# Patient Record
Sex: FEMALE | Race: WHITE | NOT HISPANIC OR LATINO | Employment: FULL TIME | URBAN - METROPOLITAN AREA
[De-identification: names, ages, dates, MRNs, and addresses within clinical notes are randomized per-mention and may not be internally consistent; named-entity substitution may affect disease eponyms.]

---

## 2022-09-06 ENCOUNTER — INITIAL PRENATAL (OUTPATIENT)
Dept: OBSTETRICS AND GYNECOLOGY | Facility: CLINIC | Age: 22
End: 2022-09-06

## 2022-09-06 VITALS — DIASTOLIC BLOOD PRESSURE: 60 MMHG | WEIGHT: 109.6 LBS | SYSTOLIC BLOOD PRESSURE: 110 MMHG

## 2022-09-06 DIAGNOSIS — Z86.39 HISTORY OF THYROID DISEASE: ICD-10-CM

## 2022-09-06 DIAGNOSIS — O21.9 NAUSEA AND VOMITING IN PREGNANCY: ICD-10-CM

## 2022-09-06 DIAGNOSIS — Z3A.08 8 WEEKS GESTATION OF PREGNANCY: Primary | ICD-10-CM

## 2022-09-06 DIAGNOSIS — Z86.32 HISTORY OF GESTATIONAL DIABETES MELLITUS: ICD-10-CM

## 2022-09-06 DIAGNOSIS — Z86.32 HISTORY OF GESTATIONAL DIABETES: ICD-10-CM

## 2022-09-06 PROBLEM — Z34.90 PREGNANCY: Status: ACTIVE | Noted: 2022-09-06

## 2022-09-06 PROCEDURE — 99204 OFFICE O/P NEW MOD 45 MIN: CPT | Performed by: OBSTETRICS & GYNECOLOGY

## 2022-09-06 RX ORDER — PROMETHAZINE HYDROCHLORIDE 12.5 MG/1
12.5 TABLET ORAL EVERY 6 HOURS PRN
Qty: 20 TABLET | Refills: 0 | Status: SHIPPED | OUTPATIENT
Start: 2022-09-06 | End: 2022-10-10

## 2022-09-06 NOTE — PROGRESS NOTES
"    Initial ob visit     CC- Here for care of pregnancy        Shira German is a 22 y.o. female, , who presents for her first obstetrical visit.  Her last LMP was Patient's last menstrual period was 2022..    Initial positive test date : 2022 UPT          Prior obstetric issues: GDM      G1   37w5d SROM;  GDM  G2   39w5d SROM when she arrived at the hospital for IOL;  GDM    Family history of genetic issues (includes FOB): denies  Prior infections concerning in pregnancy (Rash, fever in last 2 weeks): No  Varicella Hx - unknown   Prior testing for Cystic Fibrosis Carrier or Sickle Cell Trait- no  Prepregnancy BMI - There is no height or weight on file to calculate BMI.  History of STD: no  Hx of HSV for patient or partner: no  Ultrasound Today: yes; confirmed viable iup       Additional Pertinent History   Last Pap : 6mo. ago Result: negative HPV: unknown      Last Completed Pap Smear     This patient has no relevant Health Maintenance data.        History of abnormal Pap smear: no  Family history of uterine, colon, breast, or ovarian cancer: no  Feelings of Anxiety or Depression: no  Tobacco Usage?: No   Alcohol/Drug Use?: NO  Over the age of 35 at delivery: no  Desires Genetic Screening: undecided    PMH    Current Outpatient Medications:   •  Prenatal Vit-Fe Fumarate-FA (PRENATAL VITAMIN PO), Take  by mouth., Disp: , Rfl:   •  promethazine (PHENERGAN) 12.5 MG tablet, Take 1 tablet by mouth Every 6 (Six) Hours As Needed for Nausea or Vomiting., Disp: 20 tablet, Rfl: 0     Past Medical History:   Diagnosis Date   • History of gestational diabetes mellitus (GDM)     G1 and G2   • 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 No pertinent surgical history.    Review of Systems   Review of Systems  Patient Reports: Nausea and vomiting  Patient Denies: Spotting and Cramping  All systems reviewed and otherwise normal.    I have " reviewed and agree with the HPI, ROS, and historical information as entered above. Jennifer Gaviria MD    /60   Wt 49.7 kg (109 lb 9.6 oz)   LMP 2022     The additional following portions of the patient's history were reviewed and updated as appropriate: allergies, current medications, past family history, past medical history, past social history, past surgical history and problem list.    Physical Exam  General:  well developed; well nourished  no acute distress   Chest/Respiratory: No labored breathing, normal respiratory effort, normal appearance, no respiratory noises noted   Heart:  normal rate, regular rhythm,  no murmurs, rubs, or gallops   Thyroid: normal to inspection and palpation   Breasts:  Not performed.   Abdomen: soft, non-tender; no masses  no umbilical or inguinal hernias are present  no hepato-splenomegaly   Pelvis: Not performed.        Assessment and Plan    Problem List Items Addressed This Visit        Endocrine and Metabolic    History of gestational diabetes    Overview     Hemoglobin A1c at first visit.  Early Glucola at 12 weeks.            Gravid and     Pregnancy - Primary    Overview      at 37+5 weeks  6 pounds 14 ounces.  [facility] A1 diabetes   at 39+6 weeks 6 pounds 7 ounces .  [facility] A1 diabetes         Relevant Orders    HIV-1 / O / 2 Ag / Antibody 4th Generation    Chlamydia trachomatis, Neisseria gonorrhoeae, PCR w/ confirmation - Urine, Urine, Clean Catch    Obstetric Panel    Urine Culture - Urine, Urine, Clean Catch    Urinalysis With Microscopic - Urine, Clean Catch    TSH    Hemoglobin A1c      Other Visit Diagnoses     History of thyroid disease        Relevant Orders    TSH    T4, Free    History of gestational diabetes mellitus        Relevant Orders    HIV-1 / O / 2 Ag / Antibody 4th Generation    Chlamydia trachomatis, Neisseria gonorrhoeae, PCR w/ confirmation - Urine, Urine, Clean Catch    Obstetric Panel    Urine  Culture - Urine, Urine, Clean Catch    Urinalysis With Microscopic - Urine, Clean Catch    TSH    Hemoglobin A1c    Nausea and vomiting in pregnancy        Relevant Medications    promethazine (PHENERGAN) 12.5 MG tablet          1. Pregnancy at 8w5d  2. Reviewed routine prenatal care with the office and educational materials given  3. Lab(s) Ordered  4. Discussed options for genetic testing including first trimester nuchal translucency screen, genetic disease carrier testing, quadruple screen, and [place].  5. Patient refuses HIV testing  6. Discontinue the use of all non-medicinal drugs and chemicals  7. Nausea/Vomiting - desires medication.  Options discussed and encouraged to be proactive to avoid constipation if on Zofran.  8. Patient is on Prenatal vitamins  9. Activity recommendation : 150 minutes/week of moderate intensity aerobic activity unless we limit for bleeding, hypertension or other pregnancy complication   10. hgb A1C today  11. TFT today  Return in about 4 weeks (around 10/4/2022) for glucola.      Jennifer Gaviria MD  09/06/2022

## 2022-09-07 DIAGNOSIS — E03.9 HYPOTHYROIDISM AFFECTING PREGNANCY, ANTEPARTUM: Primary | ICD-10-CM

## 2022-09-07 DIAGNOSIS — O99.280 HYPOTHYROIDISM AFFECTING PREGNANCY, ANTEPARTUM: Primary | ICD-10-CM

## 2022-09-07 LAB — T4 FREE SERPL-MCNC: 0.8 NG/DL (ref 0.93–1.7)

## 2022-09-09 ENCOUNTER — OFFICE VISIT (OUTPATIENT)
Dept: ENDOCRINOLOGY | Facility: CLINIC | Age: 22
End: 2022-09-09

## 2022-09-09 ENCOUNTER — LAB (OUTPATIENT)
Dept: LAB | Facility: HOSPITAL | Age: 22
End: 2022-09-09

## 2022-09-09 VITALS
WEIGHT: 112 LBS | HEART RATE: 78 BPM | HEIGHT: 64 IN | SYSTOLIC BLOOD PRESSURE: 102 MMHG | BODY MASS INDEX: 19.12 KG/M2 | DIASTOLIC BLOOD PRESSURE: 64 MMHG | OXYGEN SATURATION: 99 %

## 2022-09-09 DIAGNOSIS — E03.9 HYPOTHYROIDISM DURING PREGNANCY IN FIRST TRIMESTER: Primary | ICD-10-CM

## 2022-09-09 DIAGNOSIS — O99.281 HYPOTHYROIDISM DURING PREGNANCY IN FIRST TRIMESTER: Primary | ICD-10-CM

## 2022-09-09 LAB
ABO GROUP BLD: NORMAL
APPEARANCE UR: ABNORMAL
BACTERIA #/AREA URNS HPF: ABNORMAL /[HPF]
BACTERIA UR CULT: NO GROWTH
BACTERIA UR CULT: NORMAL
BASOPHILS # BLD AUTO: 0 X10E3/UL (ref 0–0.2)
BASOPHILS NFR BLD AUTO: 0 %
BILIRUB UR QL STRIP: NEGATIVE
BLD GP AB SCN SERPL QL: NEGATIVE
C TRACH RRNA SPEC QL NAA+PROBE: NEGATIVE
CASTS URNS QL MICRO: ABNORMAL /LPF
COLOR UR: YELLOW
EOSINOPHIL # BLD AUTO: 0 X10E3/UL (ref 0–0.4)
EOSINOPHIL NFR BLD AUTO: 0 %
EPI CELLS #/AREA URNS HPF: ABNORMAL /HPF (ref 0–10)
ERYTHROCYTE [DISTWIDTH] IN BLOOD BY AUTOMATED COUNT: 12.2 % (ref 11.7–15.4)
GLUCOSE UR QL STRIP: NEGATIVE
HBA1C MFR BLD: 5.3 % (ref 4.8–5.6)
HBV SURFACE AG SERPL QL IA: NEGATIVE
HCT VFR BLD AUTO: 34.7 % (ref 34–46.6)
HCV AB S/CO SERPL IA: <0.1 S/CO RATIO (ref 0–0.9)
HGB BLD-MCNC: 12 G/DL (ref 11.1–15.9)
HGB UR QL STRIP: NEGATIVE
HIV 1+2 AB+HIV1 P24 AG SERPL QL IA: NON REACTIVE
IMM GRANULOCYTES # BLD AUTO: 0 X10E3/UL (ref 0–0.1)
IMM GRANULOCYTES NFR BLD AUTO: 0 %
KETONES UR QL STRIP: NEGATIVE
LEUKOCYTE ESTERASE UR QL STRIP: ABNORMAL
LYMPHOCYTES # BLD AUTO: 1.9 X10E3/UL (ref 0.7–3.1)
LYMPHOCYTES NFR BLD AUTO: 27 %
MCH RBC QN AUTO: 30.7 PG (ref 26.6–33)
MCHC RBC AUTO-ENTMCNC: 34.6 G/DL (ref 31.5–35.7)
MCV RBC AUTO: 89 FL (ref 79–97)
MICRO URNS: ABNORMAL
MONOCYTES # BLD AUTO: 0.4 X10E3/UL (ref 0.1–0.9)
MONOCYTES NFR BLD AUTO: 6 %
N GONORRHOEA RRNA SPEC QL NAA+PROBE: NEGATIVE
NEUTROPHILS # BLD AUTO: 4.6 X10E3/UL (ref 1.4–7)
NEUTROPHILS NFR BLD AUTO: 67 %
NITRITE UR QL STRIP: NEGATIVE
PH UR STRIP: 7.5 [PH] (ref 5–7.5)
PLATELET # BLD AUTO: 210 X10E3/UL (ref 150–450)
PROT UR QL STRIP: ABNORMAL
RBC # BLD AUTO: 3.91 X10E6/UL (ref 3.77–5.28)
RBC #/AREA URNS HPF: ABNORMAL /HPF (ref 0–2)
RH BLD: POSITIVE
RPR SER QL: NON REACTIVE
RUBV IGG SERPL IA-ACNC: 1.98 INDEX
SP GR UR STRIP: 1.02 (ref 1–1.03)
TSH SERPL DL<=0.005 MIU/L-ACNC: 12.4 UIU/ML (ref 0.45–4.5)
UROBILINOGEN UR STRIP-MCNC: 1 MG/DL (ref 0.2–1)
WBC # BLD AUTO: 6.9 X10E3/UL (ref 3.4–10.8)
WBC #/AREA URNS HPF: ABNORMAL /HPF (ref 0–5)

## 2022-09-09 PROCEDURE — 84439 ASSAY OF FREE THYROXINE: CPT | Performed by: INTERNAL MEDICINE

## 2022-09-09 PROCEDURE — 84443 ASSAY THYROID STIM HORMONE: CPT | Performed by: INTERNAL MEDICINE

## 2022-09-09 PROCEDURE — 99204 OFFICE O/P NEW MOD 45 MIN: CPT | Performed by: INTERNAL MEDICINE

## 2022-09-09 RX ORDER — LEVOTHYROXINE SODIUM 0.05 MG/1
50 TABLET ORAL DAILY
Qty: 30 TABLET | Refills: 3
Start: 2022-09-09 | End: 2022-09-15 | Stop reason: SDUPTHER

## 2022-09-09 NOTE — PROGRESS NOTES
"Chief Complaint   Patient presents with   • Hypothyroidism        New patient who is being seen in consultation regarding hypothyroidism inpregnancy at the request of Jennifer Gaviria, *     HPI   Shira German is a 22 y.o. female who presents for evaluation of hypothyroidism in pregnancy.    Patient reports that she was initially diagnosed with thyroid dysfunction several years ago.  She did take thyroid hormone replacement both during and after her previous pregnancies.  She reports that she self discontinued thyroid hormone.  She is unable to tell me when she last took thyroid hormone.  She does report that she had labs completed locally when she initially discovered that she was pregnant and was told that thyroid labs were normal.  She had repeat labs recently which indicated hypothyroidism and patient was referred for further evaluation and management.  Patient reports that she generally feels well.  She reports that she previously took levothyroxine to get this at a dose of 75 mcg daily.  She is now approximately 9 weeks gestation.  She reports nausea and vomiting as well as fatigue.  She denies changes in bowel habits.  No changes in weight. Patient's mother reports that she has hypothyroidism secondary to Hashimoto's disease.    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   • Hypothyroidism      Past Surgical History:   Procedure Laterality Date   • KNEE SURGERY     • TONSILLECTOMY        Family History   Problem Relation Age of Onset   • Diabetes Mother    • Thyroid disease Mother    • Diabetes Brother    • Thyroid disease Brother       Social History     Socioeconomic History   • Marital status: Single   Tobacco Use   • Smoking status: Never Smoker   • Smokeless tobacco: Never Used   Vaping Use   • Vaping Use: Never used   Substance and Sexual " "Activity   • Alcohol use: Never   • Drug use: Never   • Sexual activity: Yes     Partners: Male     Birth control/protection: Condom, Depo-provera      No Known Allergies   Current Outpatient Medications on File Prior to Visit   Medication Sig Dispense Refill   • Prenatal Vit-Fe Fumarate-FA (PRENATAL VITAMIN PO) Take  by mouth.     • promethazine (PHENERGAN) 12.5 MG tablet Take 1 tablet by mouth Every 6 (Six) Hours As Needed for Nausea or Vomiting. 20 tablet 0     No current facility-administered medications on file prior to visit.        Review of Systems   Constitutional: Positive for fatigue. Negative for unexpected weight gain and unexpected weight loss.   Cardiovascular: Negative for palpitations.   Gastrointestinal: Positive for nausea and vomiting. Negative for abdominal pain.   Endocrine: Negative for cold intolerance and heat intolerance.   Neurological: Negative for tremors.   Psychiatric/Behavioral: Negative for sleep disturbance. The patient is not nervous/anxious.       Vitals:    09/09/22 1005   BP: 102/64   Pulse: 78   SpO2: 99%   Weight: 50.8 kg (112 lb)   Height: 162.6 cm (64\")   Body mass index is 19.22 kg/m².     Physical Exam  Vitals reviewed.   Constitutional:       General: She is not in acute distress.     Appearance: Normal appearance.   HENT:      Head: Normocephalic and atraumatic.   Eyes:      General: Lids are normal.      Conjunctiva/sclera: Conjunctivae normal.   Neck:      Thyroid: No thyromegaly or thyroid tenderness.   Cardiovascular:      Rate and Rhythm: Normal rate and regular rhythm.      Heart sounds: No murmur heard.  Pulmonary:      Effort: Pulmonary effort is normal.      Breath sounds: Normal breath sounds and air entry.   Abdominal:      General: Bowel sounds are normal.      Palpations: Abdomen is soft.      Tenderness: There is no abdominal tenderness.   Lymphadenopathy:      Head:      Right side of head: No submandibular adenopathy.      Left side of head: No " submandibular adenopathy.   Skin:     General: Skin is warm and dry.      Findings: No rash.   Neurological:      General: No focal deficit present.      Mental Status: She is alert.      Deep Tendon Reflexes: Reflexes are normal and symmetric.   Psychiatric:         Mood and Affect: Mood and affect normal.         Behavior: Behavior is cooperative.        Labs/Imaging   Latest Reference Range & Units 09/06/22 11:47 09/06/22 11:48   TSH Baseline 0.450 - 4.500 uIU/mL 12.400 (H)    Free T4 0.93 - 1.70 ng/dL  0.80 (L)   (H): Data is abnormally high  (L): Data is abnormally low  Assessment and Plan    Diagnoses and all orders for this visit:    1. Hypothyroidism during pregnancy in first trimester (Primary)  -     TSH  -     T4, Free  -     T4  Currently 9 weeks gestation  Not currently taking thyroid hormone  Most recent labs with a be indicative of hypothyroidism, requesting prior testing from outside provider.  Repeating labs today given discordance in prior testing.  Plan to initiate levothyroxine 50 mcg daily  Discussed potential adverse effects of hypothyroidism in pregnancy which include but are not limited to miscarriage, low birthweight, decreased fetal IQ, intrauterine growth restriction.  Reviewed appropriate administration of thyroid hormone.  Discussed symptoms of thyroid hormone dysregulation, patient will contact the clinic in the interim between visits with any concerning changes     Addendum dated 9/16/2022  Received outside labs dated 8/1/2022  TSH 16.3  Free T4 1.01    Return in about 6 weeks (around 10/21/2022). The patient was instructed to contact the clinic with any interval questions or concerns.    Michelle Meng MD     Dictated Utilizing Dragon Dictation

## 2022-09-10 LAB
T4 FREE SERPL-MCNC: 0.76 NG/DL (ref 0.93–1.7)
T4 SERPL-MCNC: 6.38 MCG/DL (ref 4.5–11.7)
TSH SERPL DL<=0.05 MIU/L-ACNC: 7.93 UIU/ML (ref 0.27–4.2)

## 2022-09-15 RX ORDER — LEVOTHYROXINE SODIUM 0.05 MG/1
50 TABLET ORAL DAILY
Qty: 30 TABLET | Refills: 3 | Status: SHIPPED | OUTPATIENT
Start: 2022-09-15 | End: 2022-10-21

## 2022-10-10 ENCOUNTER — ROUTINE PRENATAL (OUTPATIENT)
Dept: OBSTETRICS AND GYNECOLOGY | Facility: CLINIC | Age: 22
End: 2022-10-10

## 2022-10-10 VITALS — WEIGHT: 113 LBS | BODY MASS INDEX: 19.4 KG/M2 | DIASTOLIC BLOOD PRESSURE: 70 MMHG | SYSTOLIC BLOOD PRESSURE: 108 MMHG

## 2022-10-10 DIAGNOSIS — Z86.32 HISTORY OF GESTATIONAL DIABETES: ICD-10-CM

## 2022-10-10 DIAGNOSIS — Z3A.13 13 WEEKS GESTATION OF PREGNANCY: Primary | ICD-10-CM

## 2022-10-10 LAB
GLUCOSE UR STRIP-MCNC: NEGATIVE MG/DL
PROT UR STRIP-MCNC: NEGATIVE MG/DL

## 2022-10-10 PROCEDURE — 99213 OFFICE O/P EST LOW 20 MIN: CPT

## 2022-10-10 NOTE — PROGRESS NOTES
OB FOLLOW UP  CC- Here for care of pregnancy        Shira German is a 22 y.o.  13w4d patient being seen today for her obstetrical follow up visit. Patient reports no complaints.    Her prenatal care is complicated by (and status) :   Patient Active Problem List   Diagnosis   • Pregnancy   • History of gestational diabetes   • Hypothyroidism (acquired)       Desires genetic testing?: No  Flu Status: Declines  Ultrasound Today: No    ROS -   Patient Reports : No Problems  Patient Denies: Loss of Fluid, Vaginal Spotting, Vision Changes, Headaches, Nausea  and Vomiting   Fetal Movement : normal  All other systems reviewed and are negative.     The additional following portions of the patient's history were reviewed and updated as appropriate: allergies, current medications, past family history, past medical history, past social history, past surgical history and problem list.    I have reviewed and agree with the HPI, ROS, and historical information as entered above. Susi Burdick, APRN    /70   Wt 51.3 kg (113 lb)   LMP 2022   BMI 19.40 kg/m²         EXAM:     Prenatal Vitals  BP: 108/70  Weight: 51.3 kg (113 lb)   Fetal Heart Rate: 150          Urine Glucose Read-only: Negative  Urine Protein Read-only: Negative       Assessment and Plan    Problem List Items Addressed This Visit        Endocrine and Metabolic    History of gestational diabetes    Overview     Hemoglobin A1c at first visit.  Early Glucola at 12 weeks.         Relevant Orders    Gestational Screen 1 Hr ([facility])       Gravid and     Pregnancy - Primary    Overview      at 37+5 weeks  6 pounds 14 ounces.  [facility] A1 diabetes   at 39+6 weeks 6 pounds 7 ounces .  [facility] A1 diabetes         Relevant Orders    POC Urinalysis Dipstick (Completed)       1. Pregnancy at 13w4d  2. Labs reviewed from New OB Visit.  3. Counseled on genetic testing, carrier status and option for NT  screen  4. Activity and Exercise discussed.  5. Advised low carbs/sugar. Increase protein/veg  6. 1hr gtt test next visit- Hx GDM  7. Lab(s) Ordered- 1 hr Gtt  8. Patient is on Prenatal vitamins  Return in about 4 weeks (around 11/7/2022) for LB ochoa/ NEGIN and glucola.    Susi Burdick, APRN  10/10/2022

## 2022-10-20 ENCOUNTER — LAB (OUTPATIENT)
Dept: LAB | Facility: HOSPITAL | Age: 22
End: 2022-10-20

## 2022-10-20 ENCOUNTER — OFFICE VISIT (OUTPATIENT)
Dept: ENDOCRINOLOGY | Facility: CLINIC | Age: 22
End: 2022-10-20

## 2022-10-20 VITALS
DIASTOLIC BLOOD PRESSURE: 70 MMHG | HEART RATE: 84 BPM | BODY MASS INDEX: 19.97 KG/M2 | WEIGHT: 117 LBS | OXYGEN SATURATION: 99 % | SYSTOLIC BLOOD PRESSURE: 110 MMHG | HEIGHT: 64 IN

## 2022-10-20 DIAGNOSIS — E03.9 HYPOTHYROIDISM DURING PREGNANCY IN SECOND TRIMESTER: Primary | ICD-10-CM

## 2022-10-20 DIAGNOSIS — O99.282 HYPOTHYROIDISM DURING PREGNANCY IN SECOND TRIMESTER: Primary | ICD-10-CM

## 2022-10-20 PROCEDURE — 84436 ASSAY OF TOTAL THYROXINE: CPT | Performed by: INTERNAL MEDICINE

## 2022-10-20 PROCEDURE — 99213 OFFICE O/P EST LOW 20 MIN: CPT | Performed by: INTERNAL MEDICINE

## 2022-10-20 PROCEDURE — 84443 ASSAY THYROID STIM HORMONE: CPT | Performed by: INTERNAL MEDICINE

## 2022-10-20 NOTE — PROGRESS NOTES
"Chief Complaint   Patient presents with   • Hypothyroidism        HPI   Shira German is a 22 y.o. female had concerns including Hypothyroidism.     Patient presents for follow-up of hypothyroidism in pregnancy, she is now approximately 15 weeks gestation.  She has not had interval follow-up with OB.  She does report nausea and vomiting have resolved and energy is much improved compared to last visit.  She was started on levothyroxine 50 mcg daily following last visit and denies any notable adverse effects of this medication.  She is agreeable to updating labs today.    The following portions of the patient's history were reviewed and updated as appropriate: allergies, current medications and past social history.    Review of Systems   Constitutional: Positive for fatigue.   Gastrointestinal: Negative for constipation, nausea and vomiting.      /70 (BP Location: Right arm, Patient Position: Sitting, Cuff Size: Adult)   Pulse 84   Ht 162.6 cm (64\")   Wt 53.1 kg (117 lb)   LMP 07/07/2022   SpO2 99%   BMI 20.08 kg/m²      Physical Exam      Constitutional:  well developed; well nourished  no acute distress  appears stated age   ENT/Thyroid: no thyromegaly   Eyes: Conjunctiva: clear   Respiratory:  breathing is unlabored  clear to auscultation bilaterally   Cardiovascular:  regular rate and rhythm   Chest:  Not performed.   Abdomen: Not performed.   : Not performed.   Musculoskeletal: Not performed   Skin: not performed.   Neuro: mental status, speech normal   Psych: mood and affect are within normal limits       Labs/Imaging   Latest Reference Range & Units 09/06/22 11:47 09/06/22 11:48 09/09/22 10:47   TSH Baseline 0.270 - 4.200 uIU/mL 12.400 (H)  7.930 (H)   T4, Total 4.50 - 11.70 mcg/dL   6.38   Free T4 0.93 - 1.70 ng/dL  0.80 (L) 0.76 (L)   (H): Data is abnormally high  (L): Data is abnormally low    Diagnoses and all orders for this visit:    1. Hypothyroidism during pregnancy in second trimester " (Primary)  -     TSH  -     T4  Started on levothyroxine 50 mcg daily following last visit, due for repeat labs  Discussed goal TSH during pregnancy and potential risks of hypothyroidism during pregnancy.  Update medication as needed after review of labs       Return in about 6 weeks (around 12/1/2022). The patient was instructed to contact the clinic with any interval questions or concerns.    Michelle Meng MD   Endocrinologist    Dictated Utilizing Dragon Dictation

## 2022-10-21 LAB
T4 SERPL-MCNC: 9.51 MCG/DL (ref 4.5–11.7)
TSH SERPL DL<=0.05 MIU/L-ACNC: 6.46 UIU/ML (ref 0.27–4.2)

## 2022-10-21 RX ORDER — LEVOTHYROXINE SODIUM 88 UG/1
88 TABLET ORAL DAILY
Qty: 30 TABLET | Refills: 3 | Status: SHIPPED | OUTPATIENT
Start: 2022-10-21 | End: 2022-12-02

## 2022-11-08 ENCOUNTER — ROUTINE PRENATAL (OUTPATIENT)
Dept: OBSTETRICS AND GYNECOLOGY | Facility: CLINIC | Age: 22
End: 2022-11-08

## 2022-11-08 VITALS — WEIGHT: 121.8 LBS | BODY MASS INDEX: 20.91 KG/M2 | SYSTOLIC BLOOD PRESSURE: 100 MMHG | DIASTOLIC BLOOD PRESSURE: 60 MMHG

## 2022-11-08 DIAGNOSIS — Z34.82 PRENATAL CARE, SUBSEQUENT PREGNANCY, SECOND TRIMESTER: Primary | ICD-10-CM

## 2022-11-08 DIAGNOSIS — E03.9 HYPOTHYROIDISM (ACQUIRED): ICD-10-CM

## 2022-11-08 DIAGNOSIS — Z86.32 HISTORY OF GESTATIONAL DIABETES MELLITUS: Primary | ICD-10-CM

## 2022-11-08 DIAGNOSIS — Z86.32 HISTORY OF GESTATIONAL DIABETES: ICD-10-CM

## 2022-11-08 LAB
GLUCOSE UR STRIP-MCNC: NEGATIVE MG/DL
PROT UR STRIP-MCNC: NEGATIVE MG/DL

## 2022-11-08 PROCEDURE — 99213 OFFICE O/P EST LOW 20 MIN: CPT | Performed by: OBSTETRICS & GYNECOLOGY

## 2022-11-08 NOTE — PROGRESS NOTES
OB FOLLOW UP  CC- Here for care of pregnancy        Shira German is a 22 y.o.  17w5d patient being seen today for her obstetrical follow up visit. Patient reports headache. That is not relieved by Tylenol or rest.     Her prenatal care is complicated by (and status) : None  Patient Active Problem List   Diagnosis   • Pregnancy   • History of gestational diabetes   • Hypothyroidism (acquired)       Flu Status: Declines  Ultrasound Today: No    AFP: declines    ROS -   Patient Reports : Headaches  Patient Denies: Loss of Fluid, Vaginal Spotting, Vision Changes, Nausea  and Vomiting   Fetal Movement : Present  All other systems reviewed and are negative.       The additional following portions of the patient's history were reviewed and updated as appropriate: allergies, current medications, past family history, past medical history, past social history, past surgical history and problem list.    I have reviewed and agree with the HPI, ROS, and historical information as entered above. Jennifer Gaviria MD        EXAM:     Prenatal Vitals  BP: 100/60  Weight: 55.2 kg (121 lb 12.8 oz)   Fetal Heart Rate: 143   Pelvic Exam:        Urine Glucose Read-only: Negative  Urine Protein Read-only: Negative           Assessment and Plan    Problem List Items Addressed This Visit        Endocrine and Metabolic    History of gestational diabetes    Overview     Hemoglobin A1c at first visit.  Early Glucola at 12 weeks.         Hypothyroidism (acquired)    Overview     Following with Dr. Meng, endocrinology.  Started on Synthroid.         Relevant Medications    levothyroxine (SYNTHROID, LEVOTHROID) 88 MCG tablet   Other Visit Diagnoses     Prenatal care, subsequent pregnancy, second trimester    -  Primary    Relevant Orders    US Ob 14 + Weeks Single or First Gestation          1. Pregnancy at 17w5d  2. Fetal status reassuring.   3. Counseled on MSAFP alone in relation to OTD and placental issues.  Patient  declined  4. Anatomy scan next visit.   5. Activity and Exercise discussed.  6. Patient is on Prenatal vitamins  7. Early Glucola testing today.  Return in about 3 weeks (around 11/29/2022) for anatomy usg.    Jennifer Gaviria MD  11/08/2022

## 2022-11-09 LAB — GLUCOSE 1H P 50 G GLC PO SERPL-MCNC: 104 MG/DL (ref 65–139)

## 2022-11-18 ENCOUNTER — TELEPHONE (OUTPATIENT)
Dept: OBSTETRICS AND GYNECOLOGY | Facility: CLINIC | Age: 22
End: 2022-11-18

## 2022-11-18 ENCOUNTER — ROUTINE PRENATAL (OUTPATIENT)
Dept: OBSTETRICS AND GYNECOLOGY | Facility: CLINIC | Age: 22
End: 2022-11-18

## 2022-11-18 VITALS — SYSTOLIC BLOOD PRESSURE: 118 MMHG | DIASTOLIC BLOOD PRESSURE: 68 MMHG | BODY MASS INDEX: 21.04 KG/M2 | WEIGHT: 122.6 LBS

## 2022-11-18 DIAGNOSIS — Z34.82 ENCOUNTER FOR SUPERVISION OF OTHER NORMAL PREGNANCY IN SECOND TRIMESTER: Primary | ICD-10-CM

## 2022-11-18 DIAGNOSIS — O36.8390 UNABLE TO HEAR FETAL HEART TONES AS REASON FOR ULTRASOUND SCAN: ICD-10-CM

## 2022-11-18 DIAGNOSIS — R39.9 UTI SYMPTOMS: ICD-10-CM

## 2022-11-18 DIAGNOSIS — O36.8120 DECREASED FETAL MOVEMENTS IN SECOND TRIMESTER, SINGLE OR UNSPECIFIED FETUS: ICD-10-CM

## 2022-11-18 DIAGNOSIS — O46.90 VAGINAL BLEEDING IN PREGNANCY: ICD-10-CM

## 2022-11-18 LAB
BILIRUB BLD-MCNC: NEGATIVE MG/DL
CLARITY, POC: ABNORMAL
COLOR UR: YELLOW
GLUCOSE UR STRIP-MCNC: NEGATIVE MG/DL
KETONES UR QL: NEGATIVE
LEUKOCYTE EST, POC: ABNORMAL
NITRITE UR-MCNC: NEGATIVE MG/ML
PH UR: 6 [PH] (ref 5–8)
PROT UR STRIP-MCNC: NEGATIVE MG/DL
RBC # UR STRIP: NEGATIVE /UL
SP GR UR: 1.01 (ref 1–1.03)
UROBILINOGEN UR QL: ABNORMAL

## 2022-11-18 PROCEDURE — 99213 OFFICE O/P EST LOW 20 MIN: CPT | Performed by: NURSE PRACTITIONER

## 2022-11-18 RX ORDER — NITROFURANTOIN 25; 75 MG/1; MG/1
100 CAPSULE ORAL 2 TIMES DAILY
Qty: 10 CAPSULE | Refills: 0 | Status: SHIPPED | OUTPATIENT
Start: 2022-11-18 | End: 2022-11-23

## 2022-11-18 NOTE — TELEPHONE ENCOUNTER
Pt cramping (since about 6 a.m.) and spotting started just recently. The cramping is mid abdomen per pt. Spotting is only when she goes to the bathroom.

## 2022-11-18 NOTE — TELEPHONE ENCOUNTER
Everette pt. 19w1d. States she woke up this morning with some mild cramping. Since shes been at work she feels like the cramping has become more intense and is occurring every 5 mins. Also had some bright red blood on her toilet paper once this morning. MBT: B positive. States that she has not felt fetal movement since 6pm yesterday. Scheduled pt with Sampson KATZ at 10:30am today.

## 2022-11-18 NOTE — PROGRESS NOTES
OB FOLLOW UP  CC- Here for care of pregnancy        Shira German is a 22 y.o.  19w1d patient being seen today for contractions. Patient states that she woke up agus this morning. They began 5 hour(s) ago. They are irregular, every 8-10 minutes are are uncomfortable. Pt had one episode of a small amount of bright red blood with wiping this morning. No bleeding since. The patient's blood type is RH Positive. She denies recent intercourse. She has not had a recent cervical check. She reports lower back pain. Patient denies any urinary frequency, urgency, or burning with urination. Patient also reports vomiting once this morning and that she has not felt any fetal movement since 6 pm last night.    Her prenatal care is complicated by (and status) :    Patient Active Problem List   Diagnosis   • Pregnancy   • History of gestational diabetes   • Hypothyroidism (acquired)         Ultrasound Today: Yes for FHT and CL: , AF normal, cervical length 31.2    ROS -   Patient Reports : Vaginal Spotting, lower back pain, and Contractions  Patient Denies: Loss of Fluid, Vision Changes, Headaches, Nausea  and Epigastric pain  Fetal Movement : decreased  All other systems reviewed and are negative.       The additional following portions of the patient's history were reviewed and updated as appropriate: allergies and current medications.    I have reviewed and agree with the HPI, ROS, and historical information as entered above. Sampson Hicks, APRN    /68   Wt 55.6 kg (122 lb 9.6 oz)   LMP 2022   BMI 21.04 kg/m²       EXAM:     FHT: 152 BPM   Uterine Size: size equals dates  Pelvic Exam: No    Urine glucose/protein: See prenatal flowsheet       Assessment and Plan    Problem List Items Addressed This Visit    None  Visit Diagnoses     Encounter for supervision of other normal pregnancy in second trimester    -  Primary    Relevant Orders    Urine Culture - Urine, Urine, Clean Catch     Vaginal bleeding in pregnancy        Relevant Orders    Urine Culture - Urine, Urine, Clean Catch    UTI symptoms        Relevant Medications    nitrofurantoin, macrocrystal-monohydrate, (Macrobid) 100 MG capsule    Unable to hear fetal heart tones as reason for ultrasound scan        Relevant Orders    US Ob Limited 1 + Fetuses    Decreased fetal movements in second trimester, single or unspecified fetus        Relevant Orders    US Ob Limited 1 + Fetuses          1. Pregnancy at 19w1d  2. CCUA with trace leuks. Culture pending. Macrobid Erx.  3. Fetal status reassuring. US confirmation of , CL 31.2 mm, AF normal  4. US done today. Viable IUP  5. CCUA positive, will treat with abx and send for culture. Will notify patient of results.  6. Increase PO fluids  7. Advised warm tub bath, heating pad to low back only  8. Return for regularly scheduled OB appointment.  9. RTC for worsening symptoms    Sampson Hicks, APRN  11/18/2022

## 2022-11-20 LAB
BACTERIA UR CULT: NORMAL
BACTERIA UR CULT: NORMAL

## 2022-11-29 ENCOUNTER — ROUTINE PRENATAL (OUTPATIENT)
Dept: OBSTETRICS AND GYNECOLOGY | Facility: CLINIC | Age: 22
End: 2022-11-29

## 2022-11-29 VITALS — WEIGHT: 125 LBS | BODY MASS INDEX: 21.46 KG/M2 | SYSTOLIC BLOOD PRESSURE: 110 MMHG | DIASTOLIC BLOOD PRESSURE: 72 MMHG

## 2022-11-29 DIAGNOSIS — E03.9 HYPOTHYROIDISM (ACQUIRED): ICD-10-CM

## 2022-11-29 DIAGNOSIS — O43.119 ANTEPARTUM PLACENTA CIRCUMVALLATA: ICD-10-CM

## 2022-11-29 DIAGNOSIS — Z86.32 HISTORY OF GESTATIONAL DIABETES: Primary | ICD-10-CM

## 2022-11-29 DIAGNOSIS — Z34.82 PRENATAL CARE, SUBSEQUENT PREGNANCY, SECOND TRIMESTER: ICD-10-CM

## 2022-11-29 LAB
GLUCOSE UR STRIP-MCNC: NEGATIVE MG/DL
PROT UR STRIP-MCNC: NEGATIVE MG/DL

## 2022-11-29 PROCEDURE — 99213 OFFICE O/P EST LOW 20 MIN: CPT | Performed by: OBSTETRICS & GYNECOLOGY

## 2022-11-29 NOTE — PROGRESS NOTES
OB FOLLOW UP  CC- Here for care of pregnancy        Shira German is a 22 y.o.  20w5d patient being seen today for her obstetrical follow up visit. Patient reports no complaints..     Her prenatal care is complicated by (and status) : None  Patient Active Problem List   Diagnosis   • Pregnancy   • History of gestational diabetes   • Hypothyroidism (acquired)   • Circumvallate placenta       Flu Status: Declines  Ultrasound Today: Yes  US done today: Yes.  Findings showed Female infant in breech presentation with fetal heart rate of 143.  Placenta appears circumvallate and posterior.  Three-vessel cord normal fluid volume noted.  Size consistent with dates with overall estimated fetal weight of 13 ounces.  Of note BPD is measuring 3rd percentile.  No fetal anomaly seen.  Cervical length 47 mm..  I have personally evaluated the U/S and agree with the findings. Jennifer Gaviria MD      ROS -   Patient Reports : No Problems  Patient Denies: Loss of Fluid, Vaginal Spotting, Vision Changes, Headaches, Nausea  and Vomiting   Fetal Movement : normal  All other systems reviewed and are negative.       The additional following portions of the patient's history were reviewed and updated as appropriate: allergies, current medications, past family history, past medical history, past social history, past surgical history and problem list.    I have reviewed and agree with the HPI, ROS, and historical information as entered above. Jennifer Gaviria MD    /72   Wt 56.7 kg (125 lb)   LMP 2022   BMI 21.46 kg/m²       EXAM:     Prenatal Vitals  BP: 110/72  Weight: 56.7 kg (125 lb)   Fetal Heart Rate: 143          Urine Glucose Read-only: Negative  Urine Protein Read-only: Negative       Assessment and Plan    Problem List Items Addressed This Visit        Endocrine and Metabolic    History of gestational diabetes - Primary    Overview     Hemoglobin A1c at first visit.  Early Glucola at 16  weeks.-104         Hypothyroidism (acquired)    Overview     Following with Dr. Meng, endocrinology.  Started on Synthroid.         Relevant Medications    levothyroxine (SYNTHROID, LEVOTHROID) 88 MCG tablet       Gravid and     Circumvallate placenta    Relevant Orders    US Ob Follow Up Transabdominal Approach   Other Visit Diagnoses     Prenatal care, subsequent pregnancy, second trimester              1. Pregnancy at 20w5d  2. Anatomy scan today is Complete.  BPD measuring 3rd percentile and circumvallate placenta noted.  Will reevaluate with ultrasound in 4 weeks.  3. Fetal status reassuring.   4. Activity and Exercise discussed.  5. Patient is on Prenatal vitamins  Return in about 4 weeks (around 2022) for ultrasound.    Jennifer Gaviria MD  2022

## 2022-12-01 ENCOUNTER — LAB (OUTPATIENT)
Dept: LAB | Facility: HOSPITAL | Age: 22
End: 2022-12-01

## 2022-12-01 ENCOUNTER — OFFICE VISIT (OUTPATIENT)
Dept: ENDOCRINOLOGY | Facility: CLINIC | Age: 22
End: 2022-12-01

## 2022-12-01 VITALS
WEIGHT: 125 LBS | BODY MASS INDEX: 21.34 KG/M2 | HEIGHT: 64 IN | DIASTOLIC BLOOD PRESSURE: 66 MMHG | OXYGEN SATURATION: 99 % | SYSTOLIC BLOOD PRESSURE: 106 MMHG | HEART RATE: 84 BPM

## 2022-12-01 DIAGNOSIS — O99.282 HYPOTHYROIDISM DURING PREGNANCY IN SECOND TRIMESTER: Primary | ICD-10-CM

## 2022-12-01 DIAGNOSIS — O99.282 HYPOTHYROIDISM DURING PREGNANCY IN SECOND TRIMESTER: ICD-10-CM

## 2022-12-01 DIAGNOSIS — E03.9 HYPOTHYROIDISM DURING PREGNANCY IN SECOND TRIMESTER: ICD-10-CM

## 2022-12-01 DIAGNOSIS — E03.9 HYPOTHYROIDISM DURING PREGNANCY IN SECOND TRIMESTER: Primary | ICD-10-CM

## 2022-12-01 PROCEDURE — 99213 OFFICE O/P EST LOW 20 MIN: CPT | Performed by: INTERNAL MEDICINE

## 2022-12-01 PROCEDURE — 84443 ASSAY THYROID STIM HORMONE: CPT

## 2022-12-01 PROCEDURE — 84436 ASSAY OF TOTAL THYROXINE: CPT

## 2022-12-01 NOTE — PROGRESS NOTES
"Chief Complaint   Patient presents with   • Hypothyroidism        HPI   Shira German is a 22 y.o. female had concerns including Hypothyroidism.      Presents for follow-up of hypothyroidism and pregnancy.  She is now approximately 21 weeks gestation.  She did have some contractions and bleeding in the interim since last visit but reports that this resolved without intervention.  She is having a girl.  She is currently taking levothyroxine 88 mcg daily and denies missed doses.    The following portions of the patient's history were reviewed and updated as appropriate: allergies, current medications and past social history.    Review of Systems   Constitutional: Positive for fatigue. Negative for unexpected weight gain and unexpected weight loss.   Gastrointestinal: Negative for nausea.        /66 (BP Location: Right arm, Patient Position: Sitting, Cuff Size: Adult)   Pulse 84   Ht 162.6 cm (64\")   Wt 56.7 kg (125 lb)   LMP 07/07/2022   SpO2 99%   BMI 21.46 kg/m²      Physical Exam      Constitutional:  well developed; well nourished  no acute distress  appears stated age   ENT/Thyroid: no thyromegaly   Eyes: Conjunctiva: clear   Respiratory:  breathing is unlabored  clear to auscultation bilaterally   Cardiovascular:  regular rate and rhythm   Chest:  Not performed.   Abdomen: gravid   : Not performed.   Musculoskeletal: Not performed   Skin: not performed.   Neuro: mental status, speech normal   Psych: mood and affect are within normal limits       Labs/Imaging   Latest Reference Range & Units 09/06/22 11:47 09/06/22 11:48 09/09/22 10:47 10/20/22 12:29   TSH Baseline 0.270 - 4.200 uIU/mL 12.400 (H)  7.930 (H) 6.460 (H)   T4, Total 4.50 - 11.70 mcg/dL   6.38 9.51   Free T4 0.93 - 1.70 ng/dL  0.80 (L) 0.76 (L)    (H): Data is abnormally high  (L): Data is abnormally low    Diagnoses and all orders for this visit:    1. Hypothyroidism during pregnancy in second trimester (Primary)  -     TSH; Future  -  "    T4; Future  Now approximately 21 weeks gestation  Thyroid function testing is improved since initiating levothyroxine, dose was increased to 88 mcg daily following October labs.  Patient due for repeat labs, discussed goal for TSH in pregnancy.  Reviewed potential adverse effects of hypothyroidism in pregnancy.  Repeat labs today and determine next steps.       Return in about 8 weeks (around 1/26/2023). The patient was instructed to contact the clinic with any interval questions or concerns.    Michelle Meng MD   Endocrinologist    Dictated Utilizing Dragon Dictation

## 2022-12-02 LAB
T4 SERPL-MCNC: 11.2 MCG/DL (ref 4.5–11.7)
TSH SERPL DL<=0.05 MIU/L-ACNC: 8.36 UIU/ML (ref 0.27–4.2)

## 2022-12-02 RX ORDER — LEVOTHYROXINE SODIUM 112 UG/1
112 TABLET ORAL DAILY
Qty: 30 TABLET | Refills: 3 | Status: SHIPPED | OUTPATIENT
Start: 2022-12-02 | End: 2023-01-12

## 2022-12-28 ENCOUNTER — ROUTINE PRENATAL (OUTPATIENT)
Dept: OBSTETRICS AND GYNECOLOGY | Facility: CLINIC | Age: 22
End: 2022-12-28

## 2022-12-28 VITALS — SYSTOLIC BLOOD PRESSURE: 104 MMHG | BODY MASS INDEX: 21.87 KG/M2 | WEIGHT: 127.4 LBS | DIASTOLIC BLOOD PRESSURE: 60 MMHG

## 2022-12-28 DIAGNOSIS — Z86.32 HISTORY OF GESTATIONAL DIABETES: ICD-10-CM

## 2022-12-28 DIAGNOSIS — Z34.82 PRENATAL CARE, SUBSEQUENT PREGNANCY, SECOND TRIMESTER: Primary | ICD-10-CM

## 2022-12-28 DIAGNOSIS — R20.2 NUMBNESS AND TINGLING: ICD-10-CM

## 2022-12-28 DIAGNOSIS — R20.0 NUMBNESS AND TINGLING: ICD-10-CM

## 2022-12-28 DIAGNOSIS — Z3A.24 24 WEEKS GESTATION OF PREGNANCY: ICD-10-CM

## 2022-12-28 DIAGNOSIS — O43.119 ANTEPARTUM PLACENTA CIRCUMVALLATA: ICD-10-CM

## 2022-12-28 DIAGNOSIS — E03.9 HYPOTHYROIDISM (ACQUIRED): ICD-10-CM

## 2022-12-28 LAB
GLUCOSE UR STRIP-MCNC: NEGATIVE MG/DL
PROT UR STRIP-MCNC: NEGATIVE MG/DL

## 2022-12-28 PROCEDURE — 99213 OFFICE O/P EST LOW 20 MIN: CPT | Performed by: OBSTETRICS & GYNECOLOGY

## 2022-12-28 NOTE — PROGRESS NOTES
OB FOLLOW UP  CC- Here for care of pregnancy        Shira German is a 22 y.o.  24w6d patient being seen today for her obstetrical follow up visit. Patient reports her left side has been going numb and tingling every afternoon. States it has been happening for about 1.5 weeks. Her vision in her left eye becomes blurry and she loses feeling in her tongue, left arm and left leg. States it lasts about 2-3 hours and she has to lay down. States this happened one time in her first pregnancy, didn't happen at all in her second pregnancy. Reports occasional headaches relieved by rest.     Her prenatal care is complicated by (and status) :   Patient Active Problem List   Diagnosis   • Pregnancy   • History of gestational diabetes   • Hypothyroidism (acquired)   • Circumvallate placenta       Flu Status: Declines  US done today: Yes.  Findings showed infant in cephalic presentation with fetal heart rate of 140.  Placenta is noted to be posterior and circumvallate.  Three-vessel cord normal fluid volume noted.  Estimated fetal weight 1 pound 7 ounces which is 34th percentile.  Head measurements previously noted to be concerning are now within normal limits.  I have personally evaluated the U/S and agree with the findings. Jennifer Gaviria MD      ROS -   Patient Reports : Vision Changes, Headaches and Numbness and tingling on left side  Patient Denies: Loss of Fluid, Vaginal Spotting, Nausea , Vomiting , Contractions and Epigastric pain  Fetal Movement : normal  All other systems reviewed and are negative.       The additional following portions of the patient's history were reviewed and updated as appropriate: allergies, current medications, past family history, past medical history, past social history, past surgical history and problem list.    I have reviewed and agree with the HPI, ROS, and historical information as entered above. Jennifer Gaviria MD    /60   Wt 57.8 kg (127 lb 6.4 oz)   LMP  2022   BMI 21.87 kg/m²       EXAM:     Prenatal Vitals  BP: 104/60  Weight: 57.8 kg (127 lb 6.4 oz)                   Urine Glucose Read-only: Negative  Urine Protein Read-only: Negative       Assessment and Plan    Problem List Items Addressed This Visit        Endocrine and Metabolic    History of gestational diabetes    Overview     Hemoglobin A1c at first visit.  Early Glucola at 16 weeks.-104         Hypothyroidism (acquired)    Overview     Following with Dr. Meng, endocrinology.  Started on Synthroid, currently at 112         Relevant Medications    levothyroxine (SYNTHROID, LEVOTHROID) 112 MCG tablet       Gravid and     Pregnancy    Overview      at 37+5 weeks  6 pounds 14 ounces.  [facility] A1 diabetes   at 39+6 weeks 6 pounds 7 ounces .  [facility] A1 diabetes         Circumvallate placenta   Other Visit Diagnoses     Prenatal care, subsequent pregnancy, second trimester    -  Primary    Relevant Orders    POC Urinalysis Dipstick (Completed)    Numbness and tingling        Relevant Orders    Ambulatory Referral to Neurology          1. Pregnancy at 24w6d  2. Fetal status reassuring.  3. No US indicated today.  4. 1 hour gtt, CBC, Antibody screen and TDAP next visit. Instructions given  5. Discussed/encouraged TDAP vaccination after 28 weeks  6. Activity and Exercise discussed.  Return in about 4 weeks (around 2023) for glucola.    Jennifer Gaviria MD  2022

## 2022-12-29 ENCOUNTER — TELEPHONE (OUTPATIENT)
Dept: OBSTETRICS AND GYNECOLOGY | Facility: CLINIC | Age: 22
End: 2022-12-29
Payer: COMMERCIAL

## 2023-01-10 ENCOUNTER — OFFICE VISIT (OUTPATIENT)
Dept: ENDOCRINOLOGY | Facility: CLINIC | Age: 23
End: 2023-01-10
Payer: COMMERCIAL

## 2023-01-10 VITALS
SYSTOLIC BLOOD PRESSURE: 94 MMHG | DIASTOLIC BLOOD PRESSURE: 60 MMHG | HEART RATE: 64 BPM | HEIGHT: 64 IN | WEIGHT: 133 LBS | OXYGEN SATURATION: 98 % | BODY MASS INDEX: 22.71 KG/M2

## 2023-01-10 DIAGNOSIS — E03.9 HYPOTHYROIDISM DURING PREGNANCY, ANTEPARTUM: Primary | ICD-10-CM

## 2023-01-10 DIAGNOSIS — O99.280 HYPOTHYROIDISM DURING PREGNANCY, ANTEPARTUM: Primary | ICD-10-CM

## 2023-01-10 PROCEDURE — 84436 ASSAY OF TOTAL THYROXINE: CPT | Performed by: INTERNAL MEDICINE

## 2023-01-10 PROCEDURE — 84443 ASSAY THYROID STIM HORMONE: CPT | Performed by: INTERNAL MEDICINE

## 2023-01-10 PROCEDURE — 99213 OFFICE O/P EST LOW 20 MIN: CPT | Performed by: INTERNAL MEDICINE

## 2023-01-10 NOTE — PROGRESS NOTES
Chief Complaint   Patient presents with   • Hypothyroidism        HPI   Shira German is a 22 y.o. female had concerns including Hypothyroidism.      Patient reports his significant health changes in the interim since her last visit.  She is now approximately 27 weeks gestation.  Levothyroxine dose was increased to 112 mcg daily following last visit, patient is not have repeat labs.  She denies any missed doses of medication.  She generally feels well and denies any new concerns from her obstetrician.    The following portions of the patient's history were reviewed and updated as appropriate: allergies, current medications and past social history.    Review of Systems   Constitutional: Negative for fatigue.   Cardiovascular: Negative for palpitations.   Gastrointestinal: Negative for nausea and vomiting.      BP 94/60 (BP Location: Right arm, Patient Position: Sitting, Cuff Size: Adult)   Pulse 64   Ht 162.6 cm (64\")   Wt 60.3 kg (133 lb)   LMP 07/07/2022   SpO2 98%   BMI 22.83 kg/m²      Physical Exam      Constitutional:  well developed; well nourished  no acute distress   ENT/Thyroid: not examined   Eyes: Conjunctiva: clear   Respiratory:  breathing is unlabored  clear to auscultation bilaterally   Cardiovascular:  regular rate and rhythm   Chest:  Not performed.   Abdomen: gravid   : Not performed.   Musculoskeletal: Not performed   Skin: not performed.   Neuro: mental status, speech normal   Psych: mood and affect are within normal limits       Labs/Imaging   Latest Reference Range & Units 12/01/22 13:25   TSH Baseline 0.270 - 4.200 uIU/mL 8.360 (H)   T4, Total 4.50 - 11.70 mcg/dL 11.20   (H): Data is abnormally high    Diagnoses and all orders for this visit:    1. Hypothyroidism during pregnancy, antepartum (Primary)  -     TSH; Future  -     T4; Future  Now approximately 27 weeks gestation  Currently taking levothyroxine 112 mcg daily.  Levothyroxine dose was increased following labs in December  2022 repeated.  Discussed goals for thyroid hormone during pregnancy and potential risks of hypothyroidism in pregnancy.  Update labs today and adjust medication as clinically indicated    Return in about 6 weeks (around 2/21/2023). The patient was instructed to contact the clinic with any interval questions or concerns.    Michelle Meng MD   Endocrinologist    Dictated Utilizing Dragon Dictation

## 2023-01-11 LAB
T4 SERPL-MCNC: 11.5 MCG/DL (ref 4.5–11.7)
TSH SERPL DL<=0.05 MIU/L-ACNC: 10.2 UIU/ML (ref 0.27–4.2)

## 2023-01-12 RX ORDER — LEVOTHYROXINE SODIUM 137 UG/1
137 TABLET ORAL DAILY
Qty: 30 TABLET | Refills: 3 | Status: SHIPPED | OUTPATIENT
Start: 2023-01-12 | End: 2023-03-08 | Stop reason: SDUPTHER

## 2023-01-25 ENCOUNTER — ROUTINE PRENATAL (OUTPATIENT)
Dept: OBSTETRICS AND GYNECOLOGY | Facility: CLINIC | Age: 23
End: 2023-01-25
Payer: COMMERCIAL

## 2023-01-25 VITALS — SYSTOLIC BLOOD PRESSURE: 108 MMHG | BODY MASS INDEX: 22.97 KG/M2 | WEIGHT: 133.8 LBS | DIASTOLIC BLOOD PRESSURE: 64 MMHG

## 2023-01-25 DIAGNOSIS — E03.9 HYPOTHYROIDISM (ACQUIRED): ICD-10-CM

## 2023-01-25 DIAGNOSIS — O43.119 ANTEPARTUM PLACENTA CIRCUMVALLATA: ICD-10-CM

## 2023-01-25 DIAGNOSIS — Z3A.28 28 WEEKS GESTATION OF PREGNANCY: ICD-10-CM

## 2023-01-25 DIAGNOSIS — Z86.32 HISTORY OF GESTATIONAL DIABETES: Primary | ICD-10-CM

## 2023-01-25 LAB
EXPIRATION DATE: 0
GLUCOSE UR STRIP-MCNC: NEGATIVE MG/DL
Lab: 0
PROT UR STRIP-MCNC: NEGATIVE MG/DL

## 2023-01-25 PROCEDURE — 99213 OFFICE O/P EST LOW 20 MIN: CPT | Performed by: OBSTETRICS & GYNECOLOGY

## 2023-01-25 NOTE — PROGRESS NOTES
OB FOLLOW UP  CC- Here for care of pregnancy        Shira German is a 22 y.o.  28w6d patient being seen today for her obstetrical follow up. Patient reports frequent leg cramps, lisa horses.     Patient undergoing Glucola testing today. She is due for her testing at 11:18. Pt did have early glucola done at 16 weeks which she passed-104.        MBT: B+  Rhogam: n/a  28 week packet: reviewed with patient , counseled on fetal movement , pediatrician list reviewed, breast pump discussed and childbirth classes reviewed  TDAP: currently denies  Flu Status: Declines  Ultrasound Today: No    Her prenatal care is complicated by (and status) :    Patient Active Problem List   Diagnosis   • Pregnancy   • History of gestational diabetes   • Hypothyroidism (acquired)   • Circumvallate placenta         ROS -   Patient Reports : Cramping  Patient Denies: Loss of Fluid, Vaginal Spotting, Vision Changes, Headaches, Nausea , Vomiting , Contractions and Epigastric pain  Fetal Movement : normal    The additional following portions of the patient's history were reviewed and updated as appropriate: allergies and current medications.    I have reviewed and agree with the HPI, ROS, and historical information as entered above. Jennifer Gaviria MD    /64   Wt 60.7 kg (133 lb 12.8 oz)   LMP 2022   BMI 22.97 kg/m²         EXAM:     Prenatal Vitals  BP: 108/64  Weight: 60.7 kg (133 lb 12.8 oz)   Fetal Heart Rate: 133      Fundal Height (cm): 28 cm        Urine Glucose Read-only: Negative  Urine Protein Read-only: Negative       Assessment and Plan    Problem List Items Addressed This Visit        Endocrine and Metabolic    History of gestational diabetes - Primary    Overview     Hemoglobin A1c at first visit.  Early Glucola at 16 weeks.-104         Hypothyroidism (acquired)    Overview     Following with Dr. Meng, endocrinology.  Started on Synthroid, currently at 112         Relevant Medications     levothyroxine (SYNTHROID, LEVOTHROID) 137 MCG tablet       Gravid and     Pregnancy    Overview      at 37+5 weeks  6 pounds 14 ounces.  [place] [id number] diabetes   at 39+6 weeks 6 pounds 7 ounces .  [facility] A1 diabetes         Relevant Orders    Antibody Screen    CBC (No Diff)    Gestational Screen 1 Hr ([facility])    POC Glucose, Urine, Qualitative, Dipstick (Completed)    POC Protein, Urine, Qualitative, Dipstick (Completed)    Circumvallate placenta    Overview     Evaluate growth at 32 weeks         Relevant Orders    US Ob Follow Up Transabdominal Approach       1. Pregnancy at 28w6d  2. 1 hr Glucola, CBC, and antibody screen today  and TDAP declined  3. Fetal movement/PTL or Labor precautions  4. Activity and Exercise discussed.  5. Ultrasound next time for growth secondary to circumvallate placenta.  Return in about 4 weeks (around 2023) for ultrasound.    Jennifer Gaviria MD  2023

## 2023-01-26 PROBLEM — O99.019 MATERNAL ANEMIA IN PREGNANCY, ANTEPARTUM: Status: ACTIVE | Noted: 2023-01-26

## 2023-01-26 LAB
BLD GP AB SCN SERPL QL: NEGATIVE
ERYTHROCYTE [DISTWIDTH] IN BLOOD BY AUTOMATED COUNT: 11.8 % (ref 12.3–15.4)
GLUCOSE 1H P 50 G GLC PO SERPL-MCNC: 78 MG/DL (ref 65–139)
HCT VFR BLD AUTO: 32.3 % (ref 34–46.6)
HGB BLD-MCNC: 10.7 G/DL (ref 12–15.9)
MCH RBC QN AUTO: 29.7 PG (ref 26.6–33)
MCHC RBC AUTO-ENTMCNC: 33.1 G/DL (ref 31.5–35.7)
MCV RBC AUTO: 89.7 FL (ref 79–97)
PLATELET # BLD AUTO: 209 10*3/MM3 (ref 140–450)
RBC # BLD AUTO: 3.6 10*6/MM3 (ref 3.77–5.28)
WBC # BLD AUTO: 10.02 10*3/MM3 (ref 3.4–10.8)

## 2023-02-24 ENCOUNTER — ROUTINE PRENATAL (OUTPATIENT)
Dept: OBSTETRICS AND GYNECOLOGY | Facility: CLINIC | Age: 23
End: 2023-02-24
Payer: COMMERCIAL

## 2023-02-24 VITALS — WEIGHT: 139.6 LBS | SYSTOLIC BLOOD PRESSURE: 110 MMHG | BODY MASS INDEX: 23.96 KG/M2 | DIASTOLIC BLOOD PRESSURE: 68 MMHG

## 2023-02-24 DIAGNOSIS — Z34.93 PRENATAL CARE IN THIRD TRIMESTER: ICD-10-CM

## 2023-02-24 DIAGNOSIS — E03.9 HYPOTHYROIDISM (ACQUIRED): ICD-10-CM

## 2023-02-24 DIAGNOSIS — Z86.32 HISTORY OF GESTATIONAL DIABETES: Primary | ICD-10-CM

## 2023-02-24 DIAGNOSIS — Z3A.33 33 WEEKS GESTATION OF PREGNANCY: ICD-10-CM

## 2023-02-24 DIAGNOSIS — O99.019 MATERNAL ANEMIA IN PREGNANCY, ANTEPARTUM: ICD-10-CM

## 2023-02-24 DIAGNOSIS — O43.119 ANTEPARTUM PLACENTA CIRCUMVALLATA: ICD-10-CM

## 2023-02-24 LAB
GLUCOSE UR STRIP-MCNC: NEGATIVE MG/DL
PROT UR STRIP-MCNC: NEGATIVE MG/DL

## 2023-02-24 PROCEDURE — 99213 OFFICE O/P EST LOW 20 MIN: CPT | Performed by: OBSTETRICS & GYNECOLOGY

## 2023-02-24 NOTE — PROGRESS NOTES
OB FOLLOW UP  CC- Here for care of pregnancy        Shira German is a 22 y.o.  33w1d patient being seen today for her obstetrical follow up visit. Patient reports nausea and vomiting in the mornings for the past week. She reports swelling in lower extremities without pitting and leg cramps at night. She also reports a rash on her abdomen that started around a week ago.      Her prenatal care is complicated by (and status) :  None  Patient Active Problem List   Diagnosis   • Pregnancy   • History of gestational diabetes   • Hypothyroidism (acquired)   • Circumvallate placenta   • Maternal anemia in pregnancy, antepartum       Flu Status: Declines  TDAP status: declines  28 week labs: Reviewed  Ultrasound Today: Yes  US done today: Yes.  Findings showed Female infant in cephalic presentation with fetal heart rate of 138.  Placenta posterior and circumvallate.  VANESSA normal at 10.4.  Estimated fetal weight 4 pounds 4 ounces which is 30th percentile..  I have personally evaluated the U/S and agree with the findings. Jennifer Gaviria MD    Non Stress Test: No.    ROS -   Patient Reports : Swelling, N/V, and a Rash  Patient Denies: Loss of Fluid, Vaginal Spotting, Vision Changes, Headaches, Contractions and Epigastric pain  Fetal Movement : normal  All other systems reviewed and are negative.       The additional following portions of the patient's history were reviewed and updated as appropriate: allergies, current medications, past family history, past medical history, past social history, past surgical history and problem list.    I have reviewed and agree with the HPI, ROS, and historical information as entered above. Jennifer Gaviria MD    /68   Wt 63.3 kg (139 lb 9.6 oz)   LMP 2022   BMI 23.96 kg/m²       EXAM:     Prenatal Vitals  BP: 110/68  Weight: 63.3 kg (139 lb 9.6 oz)   Fetal Heart Rate: 138    Abdomen with 3 small areas of flaky patch like rash.           Urine Glucose  Read-only: Negative  Urine Protein Read-only: Negative       Assessment and Plan    Problem List Items Addressed This Visit        Endocrine and Metabolic    History of gestational diabetes - Primary    Overview     Hemoglobin A1c at first visit.  Early Glucola at 16 weeks.-104  Passed at 28 weeks as well.         Hypothyroidism (acquired)    Overview     Following with Dr. Meng, endocrinology.  Started on Synthroid, currently at 112         Relevant Medications    levothyroxine (SYNTHROID, LEVOTHROID) 137 MCG tablet       Gravid and     Pregnancy    Overview      at 37+5 weeks  6 pounds 14 ounces.  [place] [id number] diabetes   at 39+6 weeks 6 pounds 7 ounces .  [place] [id number] diabetes         Circumvallate placenta    Overview     33 weeks and 1 day-Female infant in cephalic presentation with fetal heart rate of 138.  Placenta posterior and circumvallate.  VANESSA normal at 10.4.  Estimated fetal weight 4 pounds 4 ounces which is 30th percentile.            Hematology and Neoplasia    Maternal anemia in pregnancy, antepartum    Overview     Hemoglobin 10.7.  Advised iron therapy.        Other Visit Diagnoses     Prenatal care in third trimester        Relevant Orders    POC Urinalysis Dipstick (Completed)          1. Pregnancy at 33w1d  2. Fetal status reassuring.  3. 28 week labs reviewed.    4. Activity and Exercise discussed.  5. We discussed starting Pepcid to help with the nausea.  6. Recommend hydrocortisone on abdomen for rash.  Appears to be eczema-like.  May be early signs of PUPPS  7. Fetal movement/PTL or Labor precautions  Return in about 2 weeks (around 3/10/2023).    Jennifer Gaviria MD  2023

## 2023-03-08 RX ORDER — LEVOTHYROXINE SODIUM 137 UG/1
137 TABLET ORAL DAILY
Qty: 30 TABLET | Refills: 0 | Status: SHIPPED | OUTPATIENT
Start: 2023-03-08

## 2023-03-13 ENCOUNTER — ROUTINE PRENATAL (OUTPATIENT)
Dept: OBSTETRICS AND GYNECOLOGY | Facility: CLINIC | Age: 23
End: 2023-03-13
Payer: COMMERCIAL

## 2023-03-13 ENCOUNTER — LAB (OUTPATIENT)
Dept: LAB | Facility: HOSPITAL | Age: 23
End: 2023-03-13
Payer: COMMERCIAL

## 2023-03-13 VITALS — DIASTOLIC BLOOD PRESSURE: 54 MMHG | SYSTOLIC BLOOD PRESSURE: 98 MMHG | BODY MASS INDEX: 24.31 KG/M2 | WEIGHT: 141.6 LBS

## 2023-03-13 DIAGNOSIS — Z34.93 PRENATAL CARE IN THIRD TRIMESTER: ICD-10-CM

## 2023-03-13 DIAGNOSIS — O43.119 ANTEPARTUM PLACENTA CIRCUMVALLATA: ICD-10-CM

## 2023-03-13 DIAGNOSIS — O26.86 PUPPP (PRURITIC URTICARIAL PAPULES AND PLAQUES OF PREGNANCY): ICD-10-CM

## 2023-03-13 DIAGNOSIS — Z34.93 PRENATAL CARE IN THIRD TRIMESTER: Primary | ICD-10-CM

## 2023-03-13 DIAGNOSIS — E03.9 HYPOTHYROIDISM (ACQUIRED): ICD-10-CM

## 2023-03-13 DIAGNOSIS — Z3A.35 35 WEEKS GESTATION OF PREGNANCY: ICD-10-CM

## 2023-03-13 DIAGNOSIS — Z86.32 HISTORY OF GESTATIONAL DIABETES: ICD-10-CM

## 2023-03-13 DIAGNOSIS — O99.019 MATERNAL ANEMIA IN PREGNANCY, ANTEPARTUM: ICD-10-CM

## 2023-03-13 LAB
GLUCOSE UR STRIP-MCNC: NEGATIVE MG/DL
PROT UR STRIP-MCNC: NEGATIVE MG/DL

## 2023-03-13 PROCEDURE — 87081 CULTURE SCREEN ONLY: CPT

## 2023-03-13 PROCEDURE — 99213 OFFICE O/P EST LOW 20 MIN: CPT | Performed by: OBSTETRICS & GYNECOLOGY

## 2023-03-13 NOTE — PROGRESS NOTES
OB FOLLOW UP  CC- Here for care of pregnancy        Shira German is a 22 y.o.  35w4d patient being seen today for her obstetrical follow up visit. Patient reports frequent headaches. Denies any vision changes.    Her prenatal care is complicated by (and status) :   Patient Active Problem List   Diagnosis   • Pregnancy   • History of gestational diabetes   • Hypothyroidism (acquired)   • Circumvallate placenta   • Maternal anemia in pregnancy, antepartum   • PUPPP (pruritic urticarial papules and plaques of pregnancy)       GBS Status: Done Today. She is not allergic to PCN.    No Known Allergies       Her Delivery Plan is: Undecided    US today: no  Non Stress Test: No.      ROS -   Patient Reports : Headaches  Patient Denies: Loss of Fluid, Vaginal Spotting, Headaches, Nausea , Vomiting , Contractions and Epigastric pain  Fetal Movement : normal  All other systems reviewed and are negative.       The additional following portions of the patient's history were reviewed and updated as appropriate: allergies, current medications, past family history, past medical history, past social history, past surgical history and problem list.    I have reviewed and agree with the HPI, ROS, and historical information as entered above. Jennifer Gaviria MD      EXAM:     Prenatal Vitals  BP: 98/54  Weight: 64.2 kg (141 lb 9.6 oz)   Fetal Heart Rate: 143   Fundal Height (cm): 35 cm   Dilation/Effacement/Station  Dilation: 1  Effacement (%): 50  Station: -2   Pups rash seen on abdomen.      Urine Glucose Read-only: Negative  Urine Protein Read-only: Negative       Assessment and Plan    Problem List Items Addressed This Visit        Endocrine and Metabolic    History of gestational diabetes    Overview     Hemoglobin A1c at first visit.  Early Glucola at 16 weeks.-104  Passed at 28 weeks as well.         Hypothyroidism (acquired)    Overview     Following with Dr. Meng, endocrinology.  Started on Synthroid,  currently at 112         Relevant Medications    levothyroxine (SYNTHROID, LEVOTHROID) 137 MCG tablet       Gravid and     Pregnancy    Overview      at 37+5 weeks  6 pounds 14 ounces.  [facility] A1 diabetes   at 39+6 weeks 6 pounds 7 ounces .  [facility] A1 diabetes         Circumvallate placenta    Overview     33 weeks and 1 day-Female infant in cephalic presentation with fetal heart rate of 138.  Placenta posterior and circumvallate.  VANESSA normal at 10.4.  Estimated fetal weight 4 pounds 4 ounces which is 30th percentile.         PUPPP (pruritic urticarial papules and plaques of pregnancy)       Hematology and Neoplasia    Maternal anemia in pregnancy, antepartum    Overview     Hemoglobin 10.7.  Advised iron therapy.        Other Visit Diagnoses     Prenatal care in third trimester    -  Primary    Relevant Orders    POC Urinalysis Dipstick (Completed)    Group B Streptococcus Culture - Swab, Vaginal/Rectum          1. Pregnancy at 35w4d  2. Fetal status reassuring.   3. Reviewed Pre-eclampsia signs/symptoms  4. Delivery options reviewed with patient  5. Signs of labor reviewed  6. Kick counts reviewed  7. Activity and Exercise discussed.  Return in about 1 week (around 3/20/2023).    Jennifer Gaviria MD  2023

## 2023-03-14 ENCOUNTER — TELEPHONE (OUTPATIENT)
Dept: OBSTETRICS AND GYNECOLOGY | Facility: CLINIC | Age: 23
End: 2023-03-14
Payer: COMMERCIAL

## 2023-03-14 NOTE — TELEPHONE ENCOUNTER
PT calling because she lost her mucus plug this morning. She has not had any bleeding, cramping or fluid loss since. Wanting to know if she needs to do anything at this time.

## 2023-03-14 NOTE — TELEPHONE ENCOUNTER
35w5d. States lost her mucous plug this AM. Denies vaginal bleeding/cxt. States +FM and c/o vaginal pressure.     Instructed patient this can be normal but does not always indicate labor. She vu.

## 2023-03-16 ENCOUNTER — HOSPITAL ENCOUNTER (INPATIENT)
Facility: HOSPITAL | Age: 23
LOS: 2 days | Discharge: HOME | End: 2023-03-18
Attending: OBSTETRICS & GYNECOLOGY | Admitting: OBSTETRICS & GYNECOLOGY
Payer: COMMERCIAL

## 2023-03-16 ENCOUNTER — ANESTHESIA (OUTPATIENT)
Dept: LABOR AND DELIVERY | Facility: HOSPITAL | Age: 23
End: 2023-03-16
Payer: COMMERCIAL

## 2023-03-16 ENCOUNTER — ANESTHESIA EVENT (OUTPATIENT)
Dept: LABOR AND DELIVERY | Facility: HOSPITAL | Age: 23
End: 2023-03-16
Payer: COMMERCIAL

## 2023-03-16 LAB
ABO GROUP BLD: NORMAL
ABO GROUP BLD: NORMAL
ALP SERPL-CCNC: 101 U/L (ref 39–117)
ALT SERPL W P-5'-P-CCNC: 9 U/L (ref 1–33)
AST SERPL-CCNC: 14 U/L (ref 1–32)
BACTERIA SPEC AEROBE CULT: NORMAL
BILIRUB SERPL-MCNC: 0.2 MG/DL (ref 0–1.2)
BLD GP AB SCN SERPL QL: NEGATIVE
CREAT SERPL-MCNC: 0.43 MG/DL (ref 0.57–1)
DEPRECATED RDW RBC AUTO: 41.7 FL (ref 37–54)
ERYTHROCYTE [DISTWIDTH] IN BLOOD BY AUTOMATED COUNT: 13.4 % (ref 12.3–15.4)
HCT VFR BLD AUTO: 33.5 % (ref 34–46.6)
HGB BLD-MCNC: 11.1 G/DL (ref 12–15.9)
LDH SERPL-CCNC: 159 U/L (ref 135–214)
MCH RBC QN AUTO: 28.5 PG (ref 26.6–33)
MCHC RBC AUTO-ENTMCNC: 33.1 G/DL (ref 31.5–35.7)
MCV RBC AUTO: 85.9 FL (ref 79–97)
PLATELET # BLD AUTO: 205 10*3/MM3 (ref 140–450)
PMV BLD AUTO: 10.7 FL (ref 6–12)
RBC # BLD AUTO: 3.9 10*6/MM3 (ref 3.77–5.28)
RH BLD: POSITIVE
RH BLD: POSITIVE
T&S EXPIRATION DATE: NORMAL
URATE SERPL-MCNC: 4.5 MG/DL (ref 2.4–5.7)
WBC NRBC COR # BLD: 11.83 10*3/MM3 (ref 3.4–10.8)

## 2023-03-16 PROCEDURE — 86900 BLOOD TYPING SEROLOGIC ABO: CPT

## 2023-03-16 PROCEDURE — 86901 BLOOD TYPING SEROLOGIC RH(D): CPT

## 2023-03-16 PROCEDURE — C1755 CATHETER, INTRASPINAL: HCPCS

## 2023-03-16 PROCEDURE — 59025 FETAL NON-STRESS TEST: CPT

## 2023-03-16 PROCEDURE — 82247 BILIRUBIN TOTAL: CPT | Performed by: OBSTETRICS & GYNECOLOGY

## 2023-03-16 PROCEDURE — 84460 ALANINE AMINO (ALT) (SGPT): CPT | Performed by: OBSTETRICS & GYNECOLOGY

## 2023-03-16 PROCEDURE — 25010000002 ROPIVACAINE PER 1 MG: Performed by: NURSE ANESTHETIST, CERTIFIED REGISTERED

## 2023-03-16 PROCEDURE — 86900 BLOOD TYPING SEROLOGIC ABO: CPT | Performed by: OBSTETRICS & GYNECOLOGY

## 2023-03-16 PROCEDURE — 84075 ASSAY ALKALINE PHOSPHATASE: CPT | Performed by: OBSTETRICS & GYNECOLOGY

## 2023-03-16 PROCEDURE — 83615 LACTATE (LD) (LDH) ENZYME: CPT | Performed by: OBSTETRICS & GYNECOLOGY

## 2023-03-16 PROCEDURE — 82565 ASSAY OF CREATININE: CPT | Performed by: OBSTETRICS & GYNECOLOGY

## 2023-03-16 PROCEDURE — 51703 INSERT BLADDER CATH COMPLEX: CPT

## 2023-03-16 PROCEDURE — 84450 TRANSFERASE (AST) (SGOT): CPT | Performed by: OBSTETRICS & GYNECOLOGY

## 2023-03-16 PROCEDURE — 59410 OBSTETRICAL CARE: CPT | Performed by: OBSTETRICS & GYNECOLOGY

## 2023-03-16 PROCEDURE — 84550 ASSAY OF BLOOD/URIC ACID: CPT | Performed by: OBSTETRICS & GYNECOLOGY

## 2023-03-16 PROCEDURE — 85027 COMPLETE CBC AUTOMATED: CPT | Performed by: OBSTETRICS & GYNECOLOGY

## 2023-03-16 PROCEDURE — 25010000002 FENTANYL CITRATE (PF) 50 MCG/ML SOLUTION: Performed by: NURSE ANESTHETIST, CERTIFIED REGISTERED

## 2023-03-16 PROCEDURE — 86850 RBC ANTIBODY SCREEN: CPT | Performed by: OBSTETRICS & GYNECOLOGY

## 2023-03-16 PROCEDURE — C1755 CATHETER, INTRASPINAL: HCPCS | Performed by: ANESTHESIOLOGY

## 2023-03-16 PROCEDURE — 25010000002 ONDANSETRON PER 1 MG: Performed by: NURSE ANESTHETIST, CERTIFIED REGISTERED

## 2023-03-16 PROCEDURE — 86901 BLOOD TYPING SEROLOGIC RH(D): CPT | Performed by: OBSTETRICS & GYNECOLOGY

## 2023-03-16 RX ORDER — OXYTOCIN/0.9 % SODIUM CHLORIDE 30/500 ML
2-24 PLASTIC BAG, INJECTION (ML) INTRAVENOUS
Status: DISCONTINUED | OUTPATIENT
Start: 2023-03-16 | End: 2023-03-16

## 2023-03-16 RX ORDER — SODIUM CHLORIDE, SODIUM LACTATE, POTASSIUM CHLORIDE, CALCIUM CHLORIDE 600; 310; 30; 20 MG/100ML; MG/100ML; MG/100ML; MG/100ML
125 INJECTION, SOLUTION INTRAVENOUS CONTINUOUS
Status: DISCONTINUED | OUTPATIENT
Start: 2023-03-16 | End: 2023-03-16

## 2023-03-16 RX ORDER — ONDANSETRON 2 MG/ML
4 INJECTION INTRAMUSCULAR; INTRAVENOUS EVERY 6 HOURS PRN
Status: DISCONTINUED | OUTPATIENT
Start: 2023-03-16 | End: 2023-03-18 | Stop reason: HOSPADM

## 2023-03-16 RX ORDER — OXYTOCIN/0.9 % SODIUM CHLORIDE 30/500 ML
250 PLASTIC BAG, INJECTION (ML) INTRAVENOUS CONTINUOUS
Status: ACTIVE | OUTPATIENT
Start: 2023-03-16 | End: 2023-03-16

## 2023-03-16 RX ORDER — ONDANSETRON 4 MG/1
4 TABLET, FILM COATED ORAL EVERY 6 HOURS PRN
Status: DISCONTINUED | OUTPATIENT
Start: 2023-03-16 | End: 2023-03-18 | Stop reason: HOSPADM

## 2023-03-16 RX ORDER — ONDANSETRON 2 MG/ML
4 INJECTION INTRAMUSCULAR; INTRAVENOUS EVERY 6 HOURS PRN
Status: DISCONTINUED | OUTPATIENT
Start: 2023-03-16 | End: 2023-03-16 | Stop reason: HOSPADM

## 2023-03-16 RX ORDER — METHYLERGONOVINE MALEATE 0.2 MG/ML
200 INJECTION INTRAVENOUS ONCE AS NEEDED
Status: DISCONTINUED | OUTPATIENT
Start: 2023-03-16 | End: 2023-03-16 | Stop reason: HOSPADM

## 2023-03-16 RX ORDER — ROPIVACAINE HYDROCHLORIDE 2 MG/ML
15 INJECTION, SOLUTION EPIDURAL; INFILTRATION; PERINEURAL CONTINUOUS
Status: DISCONTINUED | OUTPATIENT
Start: 2023-03-16 | End: 2023-03-16

## 2023-03-16 RX ORDER — ONDANSETRON 2 MG/ML
4 INJECTION INTRAMUSCULAR; INTRAVENOUS ONCE AS NEEDED
Status: COMPLETED | OUTPATIENT
Start: 2023-03-16 | End: 2023-03-16

## 2023-03-16 RX ORDER — HYDROCODONE BITARTRATE AND ACETAMINOPHEN 10; 325 MG/1; MG/1
1 TABLET ORAL EVERY 4 HOURS PRN
Status: DISCONTINUED | OUTPATIENT
Start: 2023-03-16 | End: 2023-03-18 | Stop reason: HOSPADM

## 2023-03-16 RX ORDER — OXYTOCIN/0.9 % SODIUM CHLORIDE 30/500 ML
999 PLASTIC BAG, INJECTION (ML) INTRAVENOUS ONCE
Status: DISCONTINUED | OUTPATIENT
Start: 2023-03-16 | End: 2023-03-18 | Stop reason: HOSPADM

## 2023-03-16 RX ORDER — EPHEDRINE SULFATE 5 MG/ML
10 INJECTION INTRAVENOUS
Status: DISCONTINUED | OUTPATIENT
Start: 2023-03-16 | End: 2023-03-16 | Stop reason: HOSPADM

## 2023-03-16 RX ORDER — CARBOPROST TROMETHAMINE 250 UG/ML
250 INJECTION, SOLUTION INTRAMUSCULAR AS NEEDED
Status: DISCONTINUED | OUTPATIENT
Start: 2023-03-16 | End: 2023-03-16 | Stop reason: HOSPADM

## 2023-03-16 RX ORDER — MAGNESIUM CARB/ALUMINUM HYDROX 105-160MG
30 TABLET,CHEWABLE ORAL ONCE
Status: DISCONTINUED | OUTPATIENT
Start: 2023-03-16 | End: 2023-03-16 | Stop reason: HOSPADM

## 2023-03-16 RX ORDER — ACETAMINOPHEN 325 MG/1
650 TABLET ORAL EVERY 6 HOURS PRN
Status: DISCONTINUED | OUTPATIENT
Start: 2023-03-16 | End: 2023-03-18 | Stop reason: HOSPADM

## 2023-03-16 RX ORDER — FAMOTIDINE 10 MG/ML
20 INJECTION, SOLUTION INTRAVENOUS ONCE AS NEEDED
Status: DISCONTINUED | OUTPATIENT
Start: 2023-03-16 | End: 2023-03-16 | Stop reason: HOSPADM

## 2023-03-16 RX ORDER — TRISODIUM CITRATE DIHYDRATE AND CITRIC ACID MONOHYDRATE 500; 334 MG/5ML; MG/5ML
30 SOLUTION ORAL ONCE
Status: DISCONTINUED | OUTPATIENT
Start: 2023-03-16 | End: 2023-03-16 | Stop reason: HOSPADM

## 2023-03-16 RX ORDER — LIDOCAINE HYDROCHLORIDE 10 MG/ML
5 INJECTION, SOLUTION EPIDURAL; INFILTRATION; INTRACAUDAL; PERINEURAL AS NEEDED
Status: DISCONTINUED | OUTPATIENT
Start: 2023-03-16 | End: 2023-03-16 | Stop reason: HOSPADM

## 2023-03-16 RX ORDER — PROMETHAZINE HYDROCHLORIDE 12.5 MG/1
12.5 TABLET ORAL EVERY 4 HOURS PRN
Status: DISCONTINUED | OUTPATIENT
Start: 2023-03-16 | End: 2023-03-18 | Stop reason: HOSPADM

## 2023-03-16 RX ORDER — DOCUSATE SODIUM 100 MG/1
100 CAPSULE, LIQUID FILLED ORAL 2 TIMES DAILY
Status: DISCONTINUED | OUTPATIENT
Start: 2023-03-16 | End: 2023-03-18 | Stop reason: HOSPADM

## 2023-03-16 RX ORDER — FENTANYL CITRATE 50 UG/ML
INJECTION, SOLUTION INTRAMUSCULAR; INTRAVENOUS AS NEEDED
Status: DISCONTINUED | OUTPATIENT
Start: 2023-03-16 | End: 2023-03-16 | Stop reason: SURG

## 2023-03-16 RX ORDER — LEVOTHYROXINE SODIUM 137 UG/1
137 TABLET ORAL DAILY
Status: DISCONTINUED | OUTPATIENT
Start: 2023-03-16 | End: 2023-03-16 | Stop reason: HOSPADM

## 2023-03-16 RX ORDER — METOCLOPRAMIDE HYDROCHLORIDE 5 MG/ML
10 INJECTION INTRAMUSCULAR; INTRAVENOUS ONCE AS NEEDED
Status: DISCONTINUED | OUTPATIENT
Start: 2023-03-16 | End: 2023-03-16 | Stop reason: HOSPADM

## 2023-03-16 RX ORDER — SODIUM CHLORIDE 0.9 % (FLUSH) 0.9 %
3 SYRINGE (ML) INJECTION EVERY 12 HOURS SCHEDULED
Status: DISCONTINUED | OUTPATIENT
Start: 2023-03-16 | End: 2023-03-16 | Stop reason: HOSPADM

## 2023-03-16 RX ORDER — MISOPROSTOL 200 UG/1
800 TABLET ORAL AS NEEDED
Status: DISCONTINUED | OUTPATIENT
Start: 2023-03-16 | End: 2023-03-16 | Stop reason: HOSPADM

## 2023-03-16 RX ORDER — IBUPROFEN 600 MG/1
600 TABLET ORAL EVERY 6 HOURS PRN
Status: DISCONTINUED | OUTPATIENT
Start: 2023-03-16 | End: 2023-03-18 | Stop reason: HOSPADM

## 2023-03-16 RX ORDER — LIDOCAINE HYDROCHLORIDE AND EPINEPHRINE 15; 5 MG/ML; UG/ML
INJECTION, SOLUTION EPIDURAL AS NEEDED
Status: DISCONTINUED | OUTPATIENT
Start: 2023-03-16 | End: 2023-03-16 | Stop reason: SURG

## 2023-03-16 RX ORDER — BUTORPHANOL TARTRATE 1 MG/ML
1 INJECTION, SOLUTION INTRAMUSCULAR; INTRAVENOUS
Status: DISCONTINUED | OUTPATIENT
Start: 2023-03-16 | End: 2023-03-16 | Stop reason: HOSPADM

## 2023-03-16 RX ORDER — SODIUM CHLORIDE 0.9 % (FLUSH) 0.9 %
10 SYRINGE (ML) INJECTION AS NEEDED
Status: DISCONTINUED | OUTPATIENT
Start: 2023-03-16 | End: 2023-03-16 | Stop reason: HOSPADM

## 2023-03-16 RX ORDER — BUTORPHANOL TARTRATE 1 MG/ML
2 INJECTION, SOLUTION INTRAMUSCULAR; INTRAVENOUS
Status: DISCONTINUED | OUTPATIENT
Start: 2023-03-16 | End: 2023-03-16 | Stop reason: HOSPADM

## 2023-03-16 RX ORDER — EPHEDRINE SULFATE 5 MG/ML
INJECTION INTRAVENOUS
Status: DISCONTINUED
Start: 2023-03-16 | End: 2023-03-18 | Stop reason: HOSPADM

## 2023-03-16 RX ORDER — HYDROCORTISONE 25 MG/G
1 CREAM TOPICAL AS NEEDED
Status: DISCONTINUED | OUTPATIENT
Start: 2023-03-16 | End: 2023-03-18 | Stop reason: HOSPADM

## 2023-03-16 RX ORDER — DIPHENHYDRAMINE HYDROCHLORIDE 50 MG/ML
12.5 INJECTION INTRAMUSCULAR; INTRAVENOUS EVERY 8 HOURS PRN
Status: DISCONTINUED | OUTPATIENT
Start: 2023-03-16 | End: 2023-03-16 | Stop reason: HOSPADM

## 2023-03-16 RX ORDER — BISACODYL 10 MG
10 SUPPOSITORY, RECTAL RECTAL DAILY PRN
Status: DISCONTINUED | OUTPATIENT
Start: 2023-03-17 | End: 2023-03-18 | Stop reason: HOSPADM

## 2023-03-16 RX ORDER — ONDANSETRON 4 MG/1
4 TABLET, FILM COATED ORAL EVERY 6 HOURS PRN
Status: DISCONTINUED | OUTPATIENT
Start: 2023-03-16 | End: 2023-03-16 | Stop reason: HOSPADM

## 2023-03-16 RX ORDER — HYDROCODONE BITARTRATE AND ACETAMINOPHEN 5; 325 MG/1; MG/1
1 TABLET ORAL EVERY 4 HOURS PRN
Status: DISCONTINUED | OUTPATIENT
Start: 2023-03-16 | End: 2023-03-18 | Stop reason: HOSPADM

## 2023-03-16 RX ORDER — SODIUM CHLORIDE 0.9 % (FLUSH) 0.9 %
1-10 SYRINGE (ML) INJECTION AS NEEDED
Status: DISCONTINUED | OUTPATIENT
Start: 2023-03-16 | End: 2023-03-18 | Stop reason: HOSPADM

## 2023-03-16 RX ADMIN — Medication 2 MILLI-UNITS/MIN: at 08:20

## 2023-03-16 RX ADMIN — WITCH HAZEL 1 PAD: 500 SOLUTION RECTAL; TOPICAL at 20:47

## 2023-03-16 RX ADMIN — SODIUM CHLORIDE, POTASSIUM CHLORIDE, SODIUM LACTATE AND CALCIUM CHLORIDE 125 ML/HR: 600; 310; 30; 20 INJECTION, SOLUTION INTRAVENOUS at 09:50

## 2023-03-16 RX ADMIN — ONDANSETRON 4 MG: 2 INJECTION INTRAMUSCULAR; INTRAVENOUS at 15:14

## 2023-03-16 RX ADMIN — LIDOCAINE HYDROCHLORIDE AND EPINEPHRINE 3 ML: 15; 5 INJECTION, SOLUTION EPIDURAL at 12:27

## 2023-03-16 RX ADMIN — DOCUSATE SODIUM 100 MG: 100 CAPSULE, LIQUID FILLED ORAL at 20:48

## 2023-03-16 RX ADMIN — Medication 1 APPLICATION: at 20:48

## 2023-03-16 RX ADMIN — ROPIVACAINE HYDROCHLORIDE 15 ML/HR: 2 INJECTION, SOLUTION EPIDURAL; INFILTRATION at 12:34

## 2023-03-16 RX ADMIN — Medication: at 20:47

## 2023-03-16 RX ADMIN — ROPIVACAINE HYDROCHLORIDE 10 ML: 5 INJECTION, SOLUTION EPIDURAL; INFILTRATION; PERINEURAL at 12:33

## 2023-03-16 RX ADMIN — SODIUM CHLORIDE, POTASSIUM CHLORIDE, SODIUM LACTATE AND CALCIUM CHLORIDE 125 ML/HR: 600; 310; 30; 20 INJECTION, SOLUTION INTRAVENOUS at 12:50

## 2023-03-16 RX ADMIN — FENTANYL CITRATE 100 MCG: 50 INJECTION, SOLUTION INTRAMUSCULAR; INTRAVENOUS at 12:30

## 2023-03-16 RX ADMIN — LEVOTHYROXINE SODIUM 137 MCG: 137 TABLET ORAL at 08:57

## 2023-03-16 NOTE — L&D DELIVERY NOTE
[facility]   Vaginal Delivery Note    Patient Name: Shira German  : 2000  MRN: [id number]    Date of Delivery: 3/16/2023     Diagnosis     Pre & Post-Delivery:  Intrauterine pregnancy at 36w0d  Labor status: Spontaneous Onset of Labor      premature rupture of membranes (PPROM) with onset of labor within 24 hours of rupture in third trimester, antepartum    Circumvallate placenta    Maternal anemia in pregnancy, antepartum    PUPPP (pruritic urticarial papules and plaques of pregnancy)     (normal spontaneous vaginal delivery)             Problem List    Transfer to Postpartum     Review the Delivery Report for details.     Delivery     Delivery:      YOB: 2024    Time of Birth:  Gestational Age 3:16 PM   36w0d     Anesthesia:      Delivering clinician: Jennifer Gaviria    Forceps?   No   Vacuum? No    Shoulder dystocia present: No        Delivery narrative: Patient pushed with great effort for 1 contraction and delivered baby without complication.  Vigorous female was placed on maternal abdomen.  Nose and mouth were bulb suction.  After 1 minute, cord was clamped x2.  Father cut cord.  Cord blood was obtained.  Placenta delivered spontaneously and intact.  No lacerations noted.  All sponge lap and needle counts were correct x2.      Infant     Findings: female  infant     Infant observations: Weight: 2390 g (5 lb 4.3 oz)   Length: 18.5  in  Observations/Comments:        Apgars: 9  @ 1 minute /    9  @ 5 minutes   Infant Name:  Kymbree     Placenta & Cord         Placenta delivered    at        Cord:   present.   Nuchal Cord?  no   Cord blood obtained:     Cord gases obtained:      Cord gas results: Venous:  No results found for: PHCVEN    Arterial:  No results found for: PHCART     Repair      Episiotomy: Not recorded     No    Lacerations: No   Estimated Blood Loss:       Quantitative Blood Loss:          Complications     none    Disposition     Mother to  Mother Baby/Postpartum  in stable condition currently.  Baby to remains with mom  in stable condition currently.    Jennifer Gaviria MD  03/16/23  16:37 EDT

## 2023-03-16 NOTE — ANESTHESIA PREPROCEDURE EVALUATION
Anesthesia Evaluation     Patient summary reviewed and Nursing notes reviewed   NPO Solid Status: > 6 hours  NPO Liquid Status: < 2 hours           Airway   Mallampati: II  TM distance: >3 FB  Neck ROM: full  No difficulty expected  Dental - normal exam     Pulmonary - negative pulmonary ROS and normal exam   Cardiovascular - negative cardio ROS and normal exam        Neuro/Psych- negative ROS  GI/Hepatic/Renal/Endo    (+)   thyroid problem hypothyroidism    Musculoskeletal (-) negative ROS    Abdominal  - normal exam    Bowel sounds: normal.   Substance History - negative use     OB/GYN    (+) Pregnant,         Other - negative ROS                       Anesthesia Plan    ASA 2     epidural       Anesthetic plan, risks, benefits, and alternatives have been provided, discussed and informed consent has been obtained with: patient.    Plan discussed with attending.        CODE STATUS:    Level Of Support Discussed With: Patient  Code Status (Patient has no pulse and is not breathing): CPR (Attempt to Resuscitate)  Medical Interventions (Patient has pulse or is breathing): Full Support  Release to patient: Routine Release

## 2023-03-16 NOTE — ANESTHESIA PROCEDURE NOTES
Labor Epidural      Patient reassessed immediately prior to procedure    Patient location during procedure: floor  Performed By  CRNA/ROSY: Sol Gates CRNA  Preanesthetic Checklist  Completed: patient identified, IV checked, site marked, risks and benefits discussed, surgical consent, monitors and equipment checked, pre-op evaluation and timeout performed  Additional Notes  Dural puncture epidural 25g sherie  Prep:  Pt Position:sitting  Sterile Tech:cap, gloves, mask and sterile barrier  Prep:DuraPrep  Monitoring:blood pressure monitoring  Epidural Block Procedure:  Approach:midline  Guidance:palpation technique  Location:L3-L4  Needle Type:Tuohy  Needle Gauge:17 G  Loss of Resistance Medium: air  Loss of Resistance: 5cm  Cath Depth at skin:10 cm  Paresthesia: none  Aspiration:negative  Test Dose:negative  Number of Attempts: 1  Post Assessment:  Dressing:occlusive dressing applied and secured with tape  Pt Tolerance:patient tolerated the procedure well with no apparent complications  Complications:no

## 2023-03-16 NOTE — H&P
"History and Physical:    Subjective     Chief Complaint   Patient presents with   • Leaking Fluid       Shira German is a 22 y.o. year old  with an Estimated Date of Delivery: 23 currently at 36w0d presenting with leaking fluid.    Prenatal care has been with Jennifer Gaviria MD.  It has been significant for Pupps, circumvallate placenta..      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   • Hypothyroidism      Past Surgical History:   Procedure Laterality Date   • KNEE SURGERY     • TONSILLECTOMY     • WISDOM TOOTH EXTRACTION       Family History   Problem Relation Age of Onset   • Diabetes Mother    • Thyroid disease Mother    • Diabetes Brother    • Thyroid disease Brother      Social History     Tobacco Use   • Smoking status: Never   • Smokeless tobacco: Never   Vaping Use   • Vaping Use: Never used   Substance Use Topics   • Alcohol use: Never   • Drug use: Never     Medications Prior to Admission   Medication Sig Dispense Refill Last Dose   • levothyroxine (SYNTHROID, LEVOTHROID) 137 MCG tablet Take 1 tablet by mouth Daily. NEEDS APPT FOR MORE REFILLS 30 tablet 0 3/15/2023   • Prenatal Vit-Fe Fumarate-FA (PRENATAL VITAMIN PO) Take  by mouth.   3/15/2023     Allergies:  Patient has no known allergies.  OB History    Para Term  AB Living   3 2 2     2   SAB IAB Ectopic Molar Multiple Live Births             2      # Outcome Date GA Lbr Saúl/2nd Weight Sex Delivery Anes PTL Lv   3 Current            2 Term 19 39w6d  2920 g (6 lb 7 oz) F Vag-Spont  N ELAINE      Complications: Chorioamnionitis   1 Term 17 37w5d  3118 g (6 lb 14 oz) F Vag-Spont  N ELAINE      Obstetric Comments   FOB #1- Pregnancy #1-3             Objective     /56 (BP Location: Right arm, Patient Position: Sitting) " "  Pulse 71   Temp 98.1 °F (36.7 °C) (Oral)   Resp 18   Ht 162.6 cm (64\")   Wt 64.4 kg (142 lb)   LMP 07/07/2022   Breastfeeding No   BMI 24.37 kg/m²     Physical Exam    General:  No acute distress   Lung: Clear to auscultation bilaterally, no wheezing, clear at bases   Heart: Regular rate and rhythm, no murmurs    Abdomen: Gravid, nontender   Extremities: 2+ DTR's bilaterally, no edema   FHT's: reactive    Cervix:  1/50/-3.  IUPC placed without difficulty.   [place]: Contraction are irregular           Lab Review   External Prenatal Results     Pregnancy Outside Results - Transcribed From Office Records - See Scanned Records For Details     Test Value Date Time    ABO  B  03/16/23 0645    Rh  Positive  03/16/23 0645    Antibody Screen  Negative  03/16/23 0544       Negative  01/25/23 1126       Negative  09/06/22 1147    Varicella IgG       Rubella  1.98 index 09/06/22 1147    Hgb  11.1 g/dL 03/16/23 0544       10.7 g/dL 01/25/23 1126       12.0 g/dL 09/06/22 1147    Hct  33.5 % 03/16/23 0544       32.3 % 01/25/23 1126       34.7 % 09/06/22 1147    Glucose Fasting GTT       Glucose Tolerance Test 1 hour       Glucose Tolerance Test 3 hour       Gonorrhea (discrete)  Negative  09/06/22 1147    Chlamydia (discrete)  Negative  09/06/22 1147    RPR  Non Reactive  09/06/22 1147    VDRL       Syphilis Antibody       HBsAg  Negative  09/06/22 1147    Herpes Simplex Virus PCR       Herpes Simplex VIrus Culture       HIV  Non Reactive  09/06/22 1147    Hep C RNA Quant PCR       Hep C Antibody  <0.1 s/co ratio 09/06/22 1147    AFP       Group B Strep  No Group B Streptococcus isolated  03/13/23 1847    GBS Susceptibility to Clindamycin       GBS Susceptibility to Erythromycin       Fetal Fibronectin       Genetic Testing, Maternal Blood             Drug Screening     Test Value Date Time    Urine Drug Screen       Amphetamine Screen       Barbiturate Screen       Benzodiazepine Screen       Methadone Screen       " Phencyclidine Screen       Opiates Screen       THC Screen       Cocaine Screen       Propoxyphene Screen       Buprenorphine Screen       Methamphetamine Screen       Oxycodone Screen       Tricyclic Antidepressants Screen             Legend    ^: Historical                            Lab Results   Component Value Date    ALKPHOS 101 2023    ALT 9 2023    AST 14 2023    CREATININE 0.43 (L) 2023    BILITOT 0.2 2023     2023    URICACID 4.5 2023     Lab Results   Component Value Date    WBC 11.83 (H) 2023    HGB 11.1 (L) 2023    HCT 33.5 (L) 2023    MCV 85.9 2023     2023        Results for orders placed in visit on 23    US Ob Follow Up Transabdominal Approach    Narrative  PAT NAME: KEVAN CYR  MED REC#: [id number]  BIRTH DA: 2000  PAT GEND: F  ACCOUNT#: [id number]  PAT TYPE: O  EXAM ADELITA: [id number]  REF PHYS JILLIAN MORRIS  ACCESSION [id number]      Indication  ========    growth; circumvallate placenta      Comparison Studies  =================    The findings of this study are compared to the prior ultrasound study dated 22      Method  =======    [place] E6, Transabdominal ultrasound examination      Pregnancy  =========    Hale pregnancy. Number of fetuses: 1      Dating  ======    LMP on: 2022  GA by LMP 33 w + 1 d  ETTA by LMP: 2023  Method of dating: based on stated ETTA  GA by prior assessment 33 w + 1 d  ETTA by prior assessment: 2023  Ultrasound examination on: 2023  GA by U/S based upon: AC, BPD, Femur, HC  GA by U/S 32 w + 4 d  ETTA by U/S: 2023  Previous dating: based on stated ETTA, selected on 2022  Agreed ETTA of previous datin2023  Assigned: based on stated ETTA, selected on 2023  Assigned GA 33 w + 1 d  Assigned ETTA: 2023  Pregnancy length 280 d      General Evaluation  ================    Cardiac activity present.  bpm.  Fetal  movements visualized.  Presentation cephalic.  Placenta Placental site: posterior circumvallate .  Umbilical cord Cord vessels: 3 vessel cord.  Amniotic fluid Amount of AF: normal. MVP 3.7 cm. VANESSA 10.4 cm. Q1 1.4 cm, Q2 2.5 cm, Q3 2.8 cm, Q4 3.7 cm.      Fetal Biometry  ============    Standard  BPD 81.3 mm  32w 5d        30%        Hadlock    .7 mm  33w 2d        19%        Hadlock    .6 mm  31w 6d        18%        Hadlock    Femur 61.9 mm  32w 1d        15%        Hadlock    HC / AC 1.08    EFW 1,918 g  30%        Aurelio    EFW (lb) 4 lb  EFW (oz) 4 oz  EFW by: Judy (BPD-HC-AC-FL)  Other: An ultrasound for fetal weight has a margin of error of up to twenty percent.  Extremities / Bony Struc  FL / BPD 0.76    FL / HC 0.21    FL / AC 0.22    Other Structures   bpm      Fetal Anatomy  ============    Heart / Thorax  Diaphragm: Appears normal  Stomach: Appears normal  Kidneys: Appears normal  Bladder: Appears normal  Gender: female  Wants to know gender: yes  Other: fetal breathing noted during exam      Impression  ==========    Female infant in cephalic presentation with fetal heart rate of 138. Placenta posterior and circumvallate. VANESSA normal at 10.4. Estimated fetal weight 4 pounds 4 ounces  which is 30th percentile.      Recommendation  ===============    Follow-up as clinically indicated.        Sonographer: Coreen Cowan RT R, [facility]  Physician: Jennifer Gaviria MD, FACOG    Electronically signed by: Jennifer Gaviria MD, FACOG at:  11:45              Patient Active Problem List    Diagnosis Date Noted   • * premature rupture of membranes (PPROM) with onset of labor within 24 hours of rupture in third trimester, antepartum 2023   • PUPPP (pruritic urticarial papules and plaques of pregnancy) 2023   • Maternal anemia in pregnancy, antepartum 2023     Note Last Updated: 2023     Hemoglobin 10.7.  Advised iron therapy.     • Circumvallate placenta  2022     Note Last Updated: 2023     33 weeks and 1 day-Female infant in cephalic presentation with fetal heart rate of 138.  Placenta posterior and circumvallate.  VANESSA normal at 10.4.  Estimated fetal weight 4 pounds 4 ounces which is 30th percentile.     • Hypothyroidism (acquired) 2022     Note Last Updated: 2022     Following with Dr. Meng, endocrinology.  Started on Synthroid, currently at 112     • Pregnancy 2022     Note Last Updated: 2022 at 37+5 weeks  6 pounds 14 ounces.  [facility] A1 diabetes   at 39+6 weeks 6 pounds 7 ounces .  [facility] A1 diabetes     • History of gestational diabetes 2022     Note Last Updated: 2023     Hemoglobin A1c at first visit.  Early Glucola at 16 weeks.-104  Passed at 28 weeks as well.          Assessment & Plan     ASSESSMENT  1. IUP at 36w0d  2. rupture of membranes   3. GBBSnegative    PLAN  1. Admit to labor and delivery   2.  pitocin         Jennifer Gaviria MD  3/16/2023@

## 2023-03-16 NOTE — PROGRESS NOTES
Patient comfortable with epidural  Afebrile vital signs stable  Fetal heart tones category 1  Contractions 2 to 4 minutes  Cervix 4/70/-2  A/P: Recommend increasing Pitocin until MVUsor greater than 1 .

## 2023-03-17 LAB
BASOPHILS # BLD AUTO: 0.02 10*3/MM3 (ref 0–0.2)
BASOPHILS NFR BLD AUTO: 0.2 % (ref 0–1.5)
DEPRECATED RDW RBC AUTO: 42.4 FL (ref 37–54)
EOSINOPHIL # BLD AUTO: 0.04 10*3/MM3 (ref 0–0.4)
EOSINOPHIL NFR BLD AUTO: 0.4 % (ref 0.3–6.2)
ERYTHROCYTE [DISTWIDTH] IN BLOOD BY AUTOMATED COUNT: 13.3 % (ref 12.3–15.4)
HCT VFR BLD AUTO: 29.7 % (ref 34–46.6)
HGB BLD-MCNC: 9.8 G/DL (ref 12–15.9)
IMM GRANULOCYTES # BLD AUTO: 0.12 10*3/MM3 (ref 0–0.05)
IMM GRANULOCYTES NFR BLD AUTO: 1.1 % (ref 0–0.5)
LYMPHOCYTES # BLD AUTO: 2.21 10*3/MM3 (ref 0.7–3.1)
LYMPHOCYTES NFR BLD AUTO: 20.7 % (ref 19.6–45.3)
MCH RBC QN AUTO: 28.8 PG (ref 26.6–33)
MCHC RBC AUTO-ENTMCNC: 33 G/DL (ref 31.5–35.7)
MCV RBC AUTO: 87.4 FL (ref 79–97)
MONOCYTES # BLD AUTO: 0.92 10*3/MM3 (ref 0.1–0.9)
MONOCYTES NFR BLD AUTO: 8.6 % (ref 5–12)
NEUTROPHILS NFR BLD AUTO: 69 % (ref 42.7–76)
NEUTROPHILS NFR BLD AUTO: 7.38 10*3/MM3 (ref 1.7–7)
NRBC BLD AUTO-RTO: 0 /100 WBC (ref 0–0.2)
PLATELET # BLD AUTO: 179 10*3/MM3 (ref 140–450)
PMV BLD AUTO: 11.1 FL (ref 6–12)
POC AMNISURE: POSITIVE
RBC # BLD AUTO: 3.4 10*6/MM3 (ref 3.77–5.28)
WBC NRBC COR # BLD: 10.69 10*3/MM3 (ref 3.4–10.8)

## 2023-03-17 PROCEDURE — 85025 COMPLETE CBC W/AUTO DIFF WBC: CPT | Performed by: OBSTETRICS & GYNECOLOGY

## 2023-03-17 PROCEDURE — 0503F POSTPARTUM CARE VISIT: CPT | Performed by: NURSE PRACTITIONER

## 2023-03-17 PROCEDURE — 84112 EVAL AMNIOTIC FLUID PROTEIN: CPT | Performed by: OBSTETRICS & GYNECOLOGY

## 2023-03-17 RX ADMIN — IBUPROFEN 600 MG: 600 TABLET, FILM COATED ORAL at 01:52

## 2023-03-17 RX ADMIN — DOCUSATE SODIUM 100 MG: 100 CAPSULE, LIQUID FILLED ORAL at 08:14

## 2023-03-17 RX ADMIN — IBUPROFEN 600 MG: 600 TABLET, FILM COATED ORAL at 15:14

## 2023-03-17 RX ADMIN — IBUPROFEN 600 MG: 600 TABLET, FILM COATED ORAL at 08:14

## 2023-03-17 NOTE — PAYOR COMM NOTE
"Kevan German (22 y.o. Female)     Date of Birth   2000    Social Security Number       Address   [facility] [place] [address]    Home Phone   [phone]    MRN   [id number]       Sikhism   Uatsdin    Marital Status   Single                            Admission Date   3/16/23    Admission Type   Elective    Admitting Provider   Jennifer Gaviria MD    Attending Provider   Jennifer Gaviria MD    Department, Room/Bed   [facility] MOTHER BABY 4A, N417/1       Discharge Date       Discharge Disposition       Discharge Destination                               Attending Provider: Jennifer Gaviria MD    Allergies: No Known Allergies    Isolation: None   Infection: None   Code Status: CPR    Ht: 162.6 cm (64\")   Wt: 64.4 kg (142 lb)    Admission Cmt: None   Principal Problem:  premature rupture of membranes (PPROM) with onset of labor within 24 hours of rupture in third trimester, antepartum [O42.013]                 Active Insurance as of 3/16/2023     Primary Coverage     Payor Plan Insurance Group Employer/Plan Group    [employer] [place]      Payor Plan Address Payor Plan Phone Number Payor Plan Fax Number Effective Dates    [address] [address] [phone]  2022 - None Entered    [facility] [id number]       Subscriber Name Subscriber Birth Date Member ID       KEVAN GERMAN 2000 [id number]                 Emergency Contacts      (Rel.) Home Phone Work Phone Mobile Phone    ARUN GERMAN (Mother) [phone] -- --               Operative/Procedure Notes (last 48 hours)      Jennifer Gaviria MD at 23 1637           [facility]   Vaginal Delivery Note    Patient Name: Kevan German  : 2000  MRN: [id number]    Date of Delivery: 3/16/2023     Diagnosis     Pre & Post-Delivery:  Intrauterine pregnancy at 36w0d  Labor status: Spontaneous Onset of Labor      premature rupture of " membranes (PPROM) with onset of labor within 24 hours of rupture in third trimester, antepartum    Circumvallate placenta    Maternal anemia in pregnancy, antepartum    PUPPP (pruritic urticarial papules and plaques of pregnancy)     (normal spontaneous vaginal delivery)             Problem List    Transfer to Postpartum     Review the Delivery Report for details.     Delivery     Delivery:      YOB: 2024    Time of Birth:  Gestational Age 3:16 PM   36w0d     Anesthesia:      Delivering clinician: Jennifer Gaviria    Forceps?   No   Vacuum? No    Shoulder dystocia present: No        Delivery narrative: Patient pushed with great effort for 1 contraction and delivered baby without complication.  Vigorous female was placed on maternal abdomen.  Nose and mouth were bulb suction.  After 1 minute, cord was clamped x2.  Father cut cord.  Cord blood was obtained.  Placenta delivered spontaneously and intact.  No lacerations noted.  All sponge lap and needle counts were correct x2.      Infant     Findings: female  infant     Infant observations: Weight: 2390 g (5 lb 4.3 oz)   Length: 18.5  in  Observations/Comments:        Apgars: 9  @ 1 minute /    9  @ 5 minutes   Infant Name:  Kymbree     Placenta & Cord         Placenta delivered    at        Cord:   present.   Nuchal Cord?  no   Cord blood obtained:     Cord gases obtained:      Cord gas results: Venous:  No results found for: PHCVEN    Arterial:  No results found for: PHCART     Repair      Episiotomy: Not recorded     No    Lacerations: No   Estimated Blood Loss:       Quantitative Blood Loss:          Complications     none    Disposition     Mother to Mother Baby/Postpartum  in stable condition currently.  Baby to remains with mom  in stable condition currently.    Jennifer Gaviria MD  23  16:37 EDT        Electronically signed by Jennifer Gaviria MD at 23 3831          Physician Progress Notes (last 48 hours)       Jennifer Gaviria MD at 03/16/23 1851        Patient comfortable with epidural  Afebrile vital signs stable  Fetal heart tones category 1  Contractions 2 to 4 minutes  Cervix 4/70/-2  A/P: Recommend increasing Pitocin until MVUsor greater than 1 .    Electronically signed by Jennifer Gaviria MD at 03/16/23 2057

## 2023-03-17 NOTE — PROGRESS NOTES
3/17/2023  PPD #1    Subjective   Shira feels well.  Patient describes her lochia less than menses.  Pain is well controlled       Objective   Temp: Temp:  [97.9 °F (36.6 °C)-98.5 °F (36.9 °C)] 98.3 °F (36.8 °C) Temp src: Oral   BP: BP: ()/(50-67) 107/66        Pulse: Heart Rate:  [] 76  RR: Resp:  [16-18] 16    General:  No acute distress   Abdomen: Fundus firm and beneath umbilicus   Pelvis: deferred     Lab Results   Component Value Date    WBC 10.69 03/17/2023    HGB 9.8 (L) 03/17/2023    HCT 29.7 (L) 03/17/2023    MCV 87.4 03/17/2023     03/17/2023    HEPBSAG Negative 09/06/2022       Assessment  1. PPD# 1 after vaginal delivery, doing well    Plan  1. Routine postpartum care.      This note has been electronically signed.    Elmira Eng, APRN  March 17, 2023

## 2023-03-17 NOTE — ANESTHESIA POSTPROCEDURE EVALUATION
Patient: Shira German    Procedure Summary     Date: 03/16/23 Room / Location:     Anesthesia Start: 1221 Anesthesia Stop: 1516    Procedure: LABOR ANALGESIA Diagnosis:     Scheduled Providers:  Provider: Jani Marquez DO    Anesthesia Type: epidural ASA Status: 2          Anesthesia Type: epidural    Vitals  Vitals Value Taken Time   /66 03/17/23 0810   Temp 98.1 °F (36.7 °C) 03/17/23 0810   Pulse 75 03/17/23 0810   Resp 18 03/17/23 0810   SpO2             Post Anesthesia Care and Evaluation    Patient location during evaluation: bedside  Patient participation: complete - patient participated  Level of consciousness: awake and alert  Pain management: adequate    Airway patency: patent  Anesthetic complications: No anesthetic complications    Cardiovascular status: acceptable  Respiratory status: acceptable  Hydration status: acceptable  Post Neuraxial Block status: Motor and sensory function returned to baseline and No signs or symptoms of PDPH

## 2023-03-18 VITALS
HEIGHT: 64 IN | WEIGHT: 142 LBS | HEART RATE: 72 BPM | SYSTOLIC BLOOD PRESSURE: 112 MMHG | BODY MASS INDEX: 24.24 KG/M2 | DIASTOLIC BLOOD PRESSURE: 64 MMHG | TEMPERATURE: 97.8 F | RESPIRATION RATE: 16 BRPM

## 2023-03-18 PROCEDURE — 0503F POSTPARTUM CARE VISIT: CPT | Performed by: NURSE PRACTITIONER

## 2023-03-18 RX ORDER — IBUPROFEN 600 MG/1
600 TABLET ORAL EVERY 6 HOURS PRN
Qty: 30 TABLET | Refills: 0 | Status: SHIPPED | OUTPATIENT
Start: 2023-03-18

## 2023-03-18 RX ADMIN — DOCUSATE SODIUM 100 MG: 100 CAPSULE, LIQUID FILLED ORAL at 08:16

## 2023-03-18 RX ADMIN — IBUPROFEN 600 MG: 600 TABLET, FILM COATED ORAL at 11:18

## 2023-03-18 RX ADMIN — IBUPROFEN 600 MG: 600 TABLET, FILM COATED ORAL at 04:35

## 2023-03-18 NOTE — DISCHARGE SUMMARY
Discharge Summary    Date of Admission: 3/16/2023  Date of Discharge:  3/18/2023      Patient: Shira German      MR#:[id number]    Delivery Provider: Jennifer Gaviria       Presenting Problem/History of Present Illness   premature rupture of membranes (PPROM) with onset of labor within 24 hours of rupture in third trimester, antepartum [O42.013]     Patient Active Problem List   Diagnosis   • Pregnancy   • History of gestational diabetes   • Hypothyroidism (acquired)   • Circumvallate placenta   • Maternal anemia in pregnancy, antepartum   • PUPPP (pruritic urticarial papules and plaques of pregnancy)   •  premature rupture of membranes (PPROM) with onset of labor within 24 hours of rupture in third trimester, antepartum   •  (normal spontaneous vaginal delivery)         Discharge Diagnosis: Vaginal delivery at 36w0d    Procedures:  Vaginal, Spontaneous     3/16/2023    3:16 PM        Hospital Course  Patient is a 22 y.o. female  at 36w0d status post vaginal delivery without complication. Postpartum the patient did well. She remained afebrile, with vital signs stable. She was ready for discharge on postpartum day 2.     Infant:   female  fetus 2390 g (5 lb 4.3 oz)  with Apgar scores of 9 , 9  at five minutes.    Condition on Discharge:  Stable    Vital Signs  Temp:  [97.8 °F (36.6 °C)-98.5 °F (36.9 °C)] 97.8 °F (36.6 °C)  Heart Rate:  [54-72] 72  Resp:  [16-18] 16  BP: (100-112)/(59-65) 112/64    Lab Results   Component Value Date    WBC 10.69 2023    HGB 9.8 (L) 2023    HCT 29.7 (L) 2023    MCV 87.4 2023     2023       Discharge Disposition  Home or Self Care    Discharge Medications     Discharge Medications      New Medications      Instructions Start Date   ibuprofen 600 MG tablet  Commonly known as: ADVIL,MOTRIN   600 mg, Oral, Every 6 Hours PRN         Continue These Medications      Instructions Start Date   levothyroxine 137 MCG  tablet  Commonly known as: SYNTHROID, LEVOTHROID   137 mcg, Oral, Daily, NEEDS APPT FOR MORE REFILLS      PRENATAL VITAMIN PO   Oral             Follow-up Appointments  Future Appointments   Date Time Provider Department Center   4/4/2023 10:00 AM Trell Alcantara MD [facility]   4/11/2023 12:00 AM [facility] LD INDUCTION ROOM 2 [facility]   4/27/2023 10:40 AM Jennifer Gaviria MD [facility] OB  [facility]     Additional Instructions for the Follow-ups that You Need to Schedule     Discharge Follow-up with Specified Provider: eileen; 6 Weeks   As directed      To: eileen    Follow Up: 6 Weeks               Elmira Eng, APRN  03/18/23  09:52 EDT  Csd

## 2023-03-19 NOTE — PAYOR COMM NOTE
"Kevan Cyr (22 y.o. Female) Notification of discharge on 3/18/2023 with baby.    Date of Birth   2000    Social Security Number       Address   [address] [place] [address]    Home Phone   [phone]    MRN   [id number]       Sabianist   Religious    Marital Status   Single                            Admission Date   3/16/23    Admission Type   Elective    Admitting Provider   Jennifer Gaviria MD    Attending Provider       Department, Room/Bed   [facility] MOTHER BABY 4A, N417/1       Discharge Date   3/18/2023    Discharge Disposition   Home or Self Care    Discharge Destination                               Attending Provider: (none)   Allergies: No Known Allergies    Isolation: None   Infection: None   Code Status: Prior    Ht: 162.6 cm (64\")   Wt: 64.4 kg (142 lb)    Admission Cmt: None   Principal Problem:  premature rupture of membranes (PPROM) with onset of labor within 24 hours of rupture in third trimester, antepartum [O42.013]                 Active Insurance as of 3/16/2023     Primary Coverage     Payor Plan Insurance Group Employer/Plan Group    [place]      Payor Plan Address Payor Plan Phone Number Payor Plan Fax Number Effective Dates    [address] [address] [phone]  2022 - None Entered    [facility] [id number]       Subscriber Name Subscriber Birth Date Member ID       KEVAN CYR 2000 [id number]                 Emergency Contacts      (Rel.) Home Phone Work Phone Mobile Phone    ARUN CYR (Mother) [phone] -- --               Discharge Summary      Elmira Eng APRN at 23 0951          Discharge Summary    Date of Admission: 3/16/2023  Date of Discharge:  3/18/2023      Patient: Kevan Cyr      MR#:[id number]    Delivery Provider: Jennifer Gaviria       Presenting Problem/History of Present Illness   premature rupture of membranes (PPROM) with " onset of labor within 24 hours of rupture in third trimester, antepartum [O42.013]     Patient Active Problem List   Diagnosis   • Pregnancy   • History of gestational diabetes   • Hypothyroidism (acquired)   • Circumvallate placenta   • Maternal anemia in pregnancy, antepartum   • PUPPP (pruritic urticarial papules and plaques of pregnancy)   •  premature rupture of membranes (PPROM) with onset of labor within 24 hours of rupture in third trimester, antepartum   •  (normal spontaneous vaginal delivery)         Discharge Diagnosis: Vaginal delivery at 36w0d    Procedures:  Vaginal, Spontaneous     3/16/2023    3:16 PM        Hospital Course  Patient is a 22 y.o. female  at 36w0d status post vaginal delivery without complication. Postpartum the patient did well. She remained afebrile, with vital signs stable. She was ready for discharge on postpartum day 2.     Infant:   female  fetus 2390 g (5 lb 4.3 oz)  with Apgar scores of 9 , 9  at five minutes.    Condition on Discharge:  Stable    Vital Signs  Temp:  [97.8 °F (36.6 °C)-98.5 °F (36.9 °C)] 97.8 °F (36.6 °C)  Heart Rate:  [54-72] 72  Resp:  [16-18] 16  BP: (100-112)/(59-65) 112/64    Lab Results   Component Value Date    WBC 10.69 2023    HGB 9.8 (L) 2023    HCT 29.7 (L) 2023    MCV 87.4 2023     2023       Discharge Disposition  Home or Self Care    Discharge Medications     Discharge Medications      New Medications      Instructions Start Date   ibuprofen 600 MG tablet  Commonly known as: ADVIL,MOTRIN   600 mg, Oral, Every 6 Hours PRN         Continue These Medications      Instructions Start Date   levothyroxine 137 MCG tablet  Commonly known as: SYNTHROID, LEVOTHROID   137 mcg, Oral, Daily, NEEDS APPT FOR MORE REFILLS      PRENATAL VITAMIN PO   Oral             Follow-up Appointments  Future Appointments   Date Time Provider Department Center   2023 10:00 AM Trell Alcantara MD [facility]    4/11/2023 12:00 AM [facility] LD INDUCTION ROOM 2 [facility]   4/27/2023 10:40 AM Jennifer Gaviria MD [facility] OB  [facility]     Additional Instructions for the Follow-ups that You Need to Schedule     Discharge Follow-up with Specified Provider: eileen; 6 Weeks   As directed      To: eileen    Follow Up: 6 Weeks               GIANNA Grady  03/18/23  09:52 EDT  Csd          Electronically signed by Elmira Eng APRN at 03/18/23 0986